# Patient Record
Sex: MALE | Race: BLACK OR AFRICAN AMERICAN | URBAN - METROPOLITAN AREA
[De-identification: names, ages, dates, MRNs, and addresses within clinical notes are randomized per-mention and may not be internally consistent; named-entity substitution may affect disease eponyms.]

---

## 2019-08-13 ENCOUNTER — INPATIENT (INPATIENT)
Facility: HOSPITAL | Age: 34
LOS: 28 days | Discharge: NURSING, REHAB OR LONG-TERM CARE | End: 2019-09-11
Attending: HOSPITALIST | Admitting: HOSPITALIST
Payer: COMMERCIAL

## 2019-08-13 VITALS
SYSTOLIC BLOOD PRESSURE: 124 MMHG | TEMPERATURE: 97 F | RESPIRATION RATE: 18 BRPM | OXYGEN SATURATION: 95 % | WEIGHT: 175.05 LBS | HEART RATE: 95 BPM | DIASTOLIC BLOOD PRESSURE: 78 MMHG | HEIGHT: 74 IN

## 2019-08-13 DIAGNOSIS — Z94.5 SKIN TRANSPLANT STATUS: Chronic | ICD-10-CM

## 2019-08-13 LAB
ALBUMIN SERPL ELPH-MCNC: 4 G/DL — SIGNIFICANT CHANGE UP (ref 3.5–5.2)
ALP SERPL-CCNC: 109 U/L — SIGNIFICANT CHANGE UP (ref 30–115)
ALT FLD-CCNC: 15 U/L — SIGNIFICANT CHANGE UP (ref 0–41)
ANION GAP SERPL CALC-SCNC: 12 MMOL/L — SIGNIFICANT CHANGE UP (ref 7–14)
APTT BLD: SIGNIFICANT CHANGE UP SEC (ref 27–39.2)
AST SERPL-CCNC: 30 U/L — SIGNIFICANT CHANGE UP (ref 0–41)
BASOPHILS # BLD AUTO: 0.03 K/UL — SIGNIFICANT CHANGE UP (ref 0–0.2)
BASOPHILS NFR BLD AUTO: 0.5 % — SIGNIFICANT CHANGE UP (ref 0–1)
BILIRUB SERPL-MCNC: 0.3 MG/DL — SIGNIFICANT CHANGE UP (ref 0.2–1.2)
BUN SERPL-MCNC: 16 MG/DL — SIGNIFICANT CHANGE UP (ref 10–20)
CALCIUM SERPL-MCNC: 9.7 MG/DL — SIGNIFICANT CHANGE UP (ref 8.5–10.1)
CHLORIDE SERPL-SCNC: 102 MMOL/L — SIGNIFICANT CHANGE UP (ref 98–110)
CO2 SERPL-SCNC: 27 MMOL/L — SIGNIFICANT CHANGE UP (ref 17–32)
CREAT SERPL-MCNC: 0.6 MG/DL — LOW (ref 0.7–1.5)
EOSINOPHIL # BLD AUTO: 0.16 K/UL — SIGNIFICANT CHANGE UP (ref 0–0.7)
EOSINOPHIL NFR BLD AUTO: 2.7 % — SIGNIFICANT CHANGE UP (ref 0–8)
GLUCOSE SERPL-MCNC: 98 MG/DL — SIGNIFICANT CHANGE UP (ref 70–99)
HCT VFR BLD CALC: 35.3 % — LOW (ref 42–52)
HGB BLD-MCNC: 11.3 G/DL — LOW (ref 14–18)
IMM GRANULOCYTES NFR BLD AUTO: 0.3 % — SIGNIFICANT CHANGE UP (ref 0.1–0.3)
INR BLD: 1.06 RATIO — SIGNIFICANT CHANGE UP (ref 0.65–1.3)
LYMPHOCYTES # BLD AUTO: 1.39 K/UL — SIGNIFICANT CHANGE UP (ref 1.2–3.4)
LYMPHOCYTES # BLD AUTO: 23.3 % — SIGNIFICANT CHANGE UP (ref 20.5–51.1)
MCHC RBC-ENTMCNC: 28.2 PG — SIGNIFICANT CHANGE UP (ref 27–31)
MCHC RBC-ENTMCNC: 32 G/DL — SIGNIFICANT CHANGE UP (ref 32–37)
MCV RBC AUTO: 88 FL — SIGNIFICANT CHANGE UP (ref 80–94)
MONOCYTES # BLD AUTO: 0.44 K/UL — SIGNIFICANT CHANGE UP (ref 0.1–0.6)
MONOCYTES NFR BLD AUTO: 7.4 % — SIGNIFICANT CHANGE UP (ref 1.7–9.3)
NEUTROPHILS # BLD AUTO: 3.93 K/UL — SIGNIFICANT CHANGE UP (ref 1.4–6.5)
NEUTROPHILS NFR BLD AUTO: 65.8 % — SIGNIFICANT CHANGE UP (ref 42.2–75.2)
NRBC # BLD: 0 /100 WBCS — SIGNIFICANT CHANGE UP (ref 0–0)
PLATELET # BLD AUTO: 419 K/UL — HIGH (ref 130–400)
POTASSIUM SERPL-MCNC: 4.9 MMOL/L — SIGNIFICANT CHANGE UP (ref 3.5–5)
POTASSIUM SERPL-SCNC: 4.9 MMOL/L — SIGNIFICANT CHANGE UP (ref 3.5–5)
PROT SERPL-MCNC: 8.3 G/DL — HIGH (ref 6–8)
PROTHROM AB SERPL-ACNC: 12.2 SEC — SIGNIFICANT CHANGE UP (ref 9.95–12.87)
RBC # BLD: 4.01 M/UL — LOW (ref 4.7–6.1)
RBC # FLD: 16.2 % — HIGH (ref 11.5–14.5)
SODIUM SERPL-SCNC: 141 MMOL/L — SIGNIFICANT CHANGE UP (ref 135–146)
WBC # BLD: 5.97 K/UL — SIGNIFICANT CHANGE UP (ref 4.8–10.8)
WBC # FLD AUTO: 5.97 K/UL — SIGNIFICANT CHANGE UP (ref 4.8–10.8)

## 2019-08-13 PROCEDURE — 99285 EMERGENCY DEPT VISIT HI MDM: CPT

## 2019-08-13 RX ORDER — ENOXAPARIN SODIUM 100 MG/ML
40 INJECTION SUBCUTANEOUS DAILY
Refills: 0 | Status: DISCONTINUED | OUTPATIENT
Start: 2019-08-13 | End: 2019-09-11

## 2019-08-13 RX ORDER — DULOXETINE HYDROCHLORIDE 30 MG/1
30 CAPSULE, DELAYED RELEASE ORAL DAILY
Refills: 0 | Status: DISCONTINUED | OUTPATIENT
Start: 2019-08-13 | End: 2019-09-11

## 2019-08-13 RX ORDER — ZINC SULFATE TAB 220 MG (50 MG ZINC EQUIVALENT) 220 (50 ZN) MG
220 TAB ORAL DAILY
Refills: 0 | Status: DISCONTINUED | OUTPATIENT
Start: 2019-08-13 | End: 2019-09-11

## 2019-08-13 RX ORDER — PANTOPRAZOLE SODIUM 20 MG/1
40 TABLET, DELAYED RELEASE ORAL
Refills: 0 | Status: DISCONTINUED | OUTPATIENT
Start: 2019-08-13 | End: 2019-09-11

## 2019-08-13 RX ORDER — POLYETHYLENE GLYCOL 3350 17 G/17G
17 POWDER, FOR SOLUTION ORAL
Refills: 0 | Status: DISCONTINUED | OUTPATIENT
Start: 2019-08-13 | End: 2019-08-18

## 2019-08-13 RX ORDER — QUETIAPINE FUMARATE 200 MG/1
50 TABLET, FILM COATED ORAL AT BEDTIME
Refills: 0 | Status: DISCONTINUED | OUTPATIENT
Start: 2019-08-13 | End: 2019-09-11

## 2019-08-13 RX ORDER — GABAPENTIN 400 MG/1
300 CAPSULE ORAL THREE TIMES A DAY
Refills: 0 | Status: DISCONTINUED | OUTPATIENT
Start: 2019-08-13 | End: 2019-09-11

## 2019-08-13 RX ORDER — PHENAZOPYRIDINE HCL 100 MG
100 TABLET ORAL EVERY 8 HOURS
Refills: 0 | Status: DISCONTINUED | OUTPATIENT
Start: 2019-08-13 | End: 2019-09-03

## 2019-08-13 RX ORDER — ASCORBIC ACID 60 MG
500 TABLET,CHEWABLE ORAL DAILY
Refills: 0 | Status: DISCONTINUED | OUTPATIENT
Start: 2019-08-13 | End: 2019-09-11

## 2019-08-13 RX ORDER — OXYCODONE AND ACETAMINOPHEN 5; 325 MG/1; MG/1
1 TABLET ORAL ONCE
Refills: 0 | Status: DISCONTINUED | OUTPATIENT
Start: 2019-08-13 | End: 2019-08-13

## 2019-08-13 RX ORDER — TRAZODONE HCL 50 MG
50 TABLET ORAL AT BEDTIME
Refills: 0 | Status: DISCONTINUED | OUTPATIENT
Start: 2019-08-13 | End: 2019-09-11

## 2019-08-13 RX ORDER — MORPHINE SULFATE 50 MG/1
15 CAPSULE, EXTENDED RELEASE ORAL EVERY 4 HOURS
Refills: 0 | Status: DISCONTINUED | OUTPATIENT
Start: 2019-08-13 | End: 2019-08-20

## 2019-08-13 RX ORDER — BACLOFEN 100 %
20 POWDER (GRAM) MISCELLANEOUS THREE TIMES A DAY
Refills: 0 | Status: DISCONTINUED | OUTPATIENT
Start: 2019-08-13 | End: 2019-09-11

## 2019-08-13 RX ORDER — SENNA PLUS 8.6 MG/1
1 TABLET ORAL DAILY
Refills: 0 | Status: DISCONTINUED | OUTPATIENT
Start: 2019-08-13 | End: 2019-09-11

## 2019-08-13 RX ADMIN — GABAPENTIN 300 MILLIGRAM(S): 400 CAPSULE ORAL at 22:52

## 2019-08-13 RX ADMIN — Medication 50 MILLIGRAM(S): at 22:52

## 2019-08-13 RX ADMIN — QUETIAPINE FUMARATE 50 MILLIGRAM(S): 200 TABLET, FILM COATED ORAL at 22:52

## 2019-08-13 RX ADMIN — OXYCODONE AND ACETAMINOPHEN 1 TABLET(S): 5; 325 TABLET ORAL at 18:52

## 2019-08-13 RX ADMIN — Medication 100 MILLIGRAM(S): at 22:53

## 2019-08-13 RX ADMIN — MORPHINE SULFATE 15 MILLIGRAM(S): 50 CAPSULE, EXTENDED RELEASE ORAL at 21:52

## 2019-08-13 RX ADMIN — MORPHINE SULFATE 15 MILLIGRAM(S): 50 CAPSULE, EXTENDED RELEASE ORAL at 22:25

## 2019-08-13 RX ADMIN — Medication 20 MILLIGRAM(S): at 22:52

## 2019-08-13 NOTE — H&P ADULT - NSHPREVIEWOFSYSTEMS_GEN_ALL_CORE
REVIEW OF SYSTEMS:    CONSTITUTIONAL: No weakness, fevers or chills  EYES/ENT: No visual changes;  No vertigo or throat pain   NECK: No pain or stiffness  RESPIRATORY: No cough, wheezing, hemoptysis; No shortness of breath  CARDIOVASCULAR: No chest pain or palpitations  GASTROINTESTINAL: No abdominal or epigastric pain. No nausea, vomiting, or hematemesis; No diarrhea or constipation. No melena or hematochezia.  GENITOURINARY: No dysuria, frequency or hematuria  NEUROLOGICAL: See HPI  SKIN: See HPI
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
## 2019-08-13 NOTE — H&P ADULT - NSHPPHYSICALEXAM_GEN_ALL_CORE
Vital Signs Last 24 Hrs  T(C): 36.8 (13 Aug 2019 19:18), Max: 36.8 (13 Aug 2019 19:18)  T(F): 98.2 (13 Aug 2019 19:18), Max: 98.2 (13 Aug 2019 19:18)  HR: 85 (13 Aug 2019 19:18) (85 - 95)  BP: 129/71 (13 Aug 2019 19:18) (124/78 - 129/71)  BP(mean): --  RR: 18 (13 Aug 2019 19:18) (18 - 18)  SpO2: 96% (13 Aug 2019 19:18) (95% - 96%)    General: WN/WD NAD  Neurology: A&Ox3, Paraplegia with significant wasting of B/L LEs  Eyes: PERRL/EOMI  ENT/Neck: Neck supple, trachea midline, No JVD  Respiratory: CTA B/L, No wheezing, rales, rhonchi  CV: RRR, S1S2, no murmurs, rubs or gallops  Abdominal: Soft, NT, ND +BS, There is a suprapubic catheter in place with a clean insertion point. No drainage or pus  Extremities: No edema, + peripheral pulses  Skin: Pressure ulcer present over the right buttocks 3x7cm in diameter. Clean base. No signs of infection.     Incisions: There are several well-healed incisions noted over the RLL  Tubes: Suprapubic catheter in place

## 2019-08-13 NOTE — ED ADULT NURSE NOTE - CHIEF COMPLAINT QUOTE
Right hip surgery complication last november, was previously in [facility] on antibiotics and now patient states he is not feeling well.

## 2019-08-13 NOTE — H&P ADULT - NSHPLABSRESULTS_GEN_ALL_CORE
11.3   5.97  )-----------( 419      ( 13 Aug 2019 15:52 )             35.3     08-13    141  |  102  |  16  ----------------------------<  98  4.9   |  27  |  0.6<L>    Ca    9.7      13 Aug 2019 15:52  TPro  8.3<H>  /  Alb  4.0  /  TBili  0.3  /  DBili  x   /  AST  30  /  ALT  15  /  AlkPhos  109  08-13
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
## 2019-08-13 NOTE — ED PROVIDER NOTE - CLINICAL SUMMARY MEDICAL DECISION MAKING FREE TEXT BOX
Patient stable improved I will admit patient for rehab evaluation and placement considering his paraplegia

## 2019-08-13 NOTE — PATIENT PROFILE ADULT - PSYCHOSOCIAL CONCERNS - OTHER
pt paraplegic and has a suprapubic catheter transfer training/gait training/balance training/bed mobility training/strengthening

## 2019-08-13 NOTE — ED ADULT NURSE REASSESSMENT NOTE - NS ED NURSE REASSESS COMMENT FT1
Wound cleaned with nss and allevyn dressing applied to area. Patient able to self turn and position in bed with no complications.

## 2019-08-13 NOTE — ED ADULT TRIAGE NOTE - CHIEF COMPLAINT QUOTE
Right hip surgery complication last november, was previously in [facility] on antibiotics and now patient states he is not feeling well.
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
## 2019-08-14 PROCEDURE — 99233 SBSQ HOSP IP/OBS HIGH 50: CPT

## 2019-08-14 RX ADMIN — Medication 1 TABLET(S): at 06:49

## 2019-08-14 RX ADMIN — Medication 20 MILLIGRAM(S): at 15:30

## 2019-08-14 RX ADMIN — ZINC SULFATE TAB 220 MG (50 MG ZINC EQUIVALENT) 220 MILLIGRAM(S): 220 (50 ZN) TAB at 11:06

## 2019-08-14 RX ADMIN — SENNA PLUS 1 TABLET(S): 8.6 TABLET ORAL at 11:06

## 2019-08-14 RX ADMIN — DULOXETINE HYDROCHLORIDE 30 MILLIGRAM(S): 30 CAPSULE, DELAYED RELEASE ORAL at 11:06

## 2019-08-14 RX ADMIN — ENOXAPARIN SODIUM 40 MILLIGRAM(S): 100 INJECTION SUBCUTANEOUS at 11:06

## 2019-08-14 RX ADMIN — Medication 100 MILLIGRAM(S): at 15:30

## 2019-08-14 RX ADMIN — POLYETHYLENE GLYCOL 3350 17 GRAM(S): 17 POWDER, FOR SOLUTION ORAL at 17:56

## 2019-08-14 RX ADMIN — MORPHINE SULFATE 15 MILLIGRAM(S): 50 CAPSULE, EXTENDED RELEASE ORAL at 07:38

## 2019-08-14 RX ADMIN — Medication 500 MILLIGRAM(S): at 11:05

## 2019-08-14 RX ADMIN — Medication 20 MILLIGRAM(S): at 06:49

## 2019-08-14 RX ADMIN — Medication 20 MILLIGRAM(S): at 21:32

## 2019-08-14 RX ADMIN — GABAPENTIN 300 MILLIGRAM(S): 400 CAPSULE ORAL at 21:32

## 2019-08-14 RX ADMIN — Medication 100 MILLIGRAM(S): at 21:33

## 2019-08-14 RX ADMIN — POLYETHYLENE GLYCOL 3350 17 GRAM(S): 17 POWDER, FOR SOLUTION ORAL at 11:06

## 2019-08-14 RX ADMIN — MORPHINE SULFATE 15 MILLIGRAM(S): 50 CAPSULE, EXTENDED RELEASE ORAL at 21:38

## 2019-08-14 RX ADMIN — Medication 1 TABLET(S): at 17:56

## 2019-08-14 RX ADMIN — PANTOPRAZOLE SODIUM 40 MILLIGRAM(S): 20 TABLET, DELAYED RELEASE ORAL at 06:49

## 2019-08-14 RX ADMIN — MORPHINE SULFATE 15 MILLIGRAM(S): 50 CAPSULE, EXTENDED RELEASE ORAL at 15:44

## 2019-08-14 RX ADMIN — GABAPENTIN 300 MILLIGRAM(S): 400 CAPSULE ORAL at 06:49

## 2019-08-14 RX ADMIN — GABAPENTIN 300 MILLIGRAM(S): 400 CAPSULE ORAL at 15:30

## 2019-08-14 RX ADMIN — MORPHINE SULFATE 15 MILLIGRAM(S): 50 CAPSULE, EXTENDED RELEASE ORAL at 15:40

## 2019-08-14 RX ADMIN — Medication 50 MILLIGRAM(S): at 21:32

## 2019-08-14 RX ADMIN — Medication 100 MILLIGRAM(S): at 06:50

## 2019-08-14 RX ADMIN — QUETIAPINE FUMARATE 50 MILLIGRAM(S): 200 TABLET, FILM COATED ORAL at 21:32

## 2019-08-14 RX ADMIN — MORPHINE SULFATE 15 MILLIGRAM(S): 50 CAPSULE, EXTENDED RELEASE ORAL at 07:36

## 2019-08-14 NOTE — ADVANCED PRACTICE NURSE CONSULT - ASSESSMENT
R buttocks full thickness wound wound about 6x4x1 in size. Wound base pale pink, no drainge or foul smell noted    Per patient wound was acquired surgically but did not heal due to being wheel chair bound.
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
## 2019-08-14 NOTE — ADVANCED PRACTICE NURSE CONSULT - RECOMMEDATIONS
Clean wound with normal saline then apply triad hydrophilic dressing twice daily   Pressure ulcer preventive measures  Asses wound and inform provider of any changes

## 2019-08-15 PROCEDURE — 99233 SBSQ HOSP IP/OBS HIGH 50: CPT

## 2019-08-15 RX ADMIN — MORPHINE SULFATE 15 MILLIGRAM(S): 50 CAPSULE, EXTENDED RELEASE ORAL at 12:31

## 2019-08-15 RX ADMIN — Medication 1 TABLET(S): at 05:01

## 2019-08-15 RX ADMIN — Medication 100 MILLIGRAM(S): at 05:09

## 2019-08-15 RX ADMIN — Medication 1 TABLET(S): at 17:45

## 2019-08-15 RX ADMIN — QUETIAPINE FUMARATE 50 MILLIGRAM(S): 200 TABLET, FILM COATED ORAL at 21:38

## 2019-08-15 RX ADMIN — Medication 500 MILLIGRAM(S): at 11:27

## 2019-08-15 RX ADMIN — GABAPENTIN 300 MILLIGRAM(S): 400 CAPSULE ORAL at 05:01

## 2019-08-15 RX ADMIN — Medication 100 MILLIGRAM(S): at 21:40

## 2019-08-15 RX ADMIN — GABAPENTIN 300 MILLIGRAM(S): 400 CAPSULE ORAL at 21:39

## 2019-08-15 RX ADMIN — MORPHINE SULFATE 15 MILLIGRAM(S): 50 CAPSULE, EXTENDED RELEASE ORAL at 21:47

## 2019-08-15 RX ADMIN — MORPHINE SULFATE 15 MILLIGRAM(S): 50 CAPSULE, EXTENDED RELEASE ORAL at 06:56

## 2019-08-15 RX ADMIN — Medication 50 MILLIGRAM(S): at 21:38

## 2019-08-15 RX ADMIN — GABAPENTIN 300 MILLIGRAM(S): 400 CAPSULE ORAL at 13:11

## 2019-08-15 RX ADMIN — Medication 20 MILLIGRAM(S): at 05:01

## 2019-08-15 RX ADMIN — ZINC SULFATE TAB 220 MG (50 MG ZINC EQUIVALENT) 220 MILLIGRAM(S): 220 (50 ZN) TAB at 11:27

## 2019-08-15 RX ADMIN — MORPHINE SULFATE 15 MILLIGRAM(S): 50 CAPSULE, EXTENDED RELEASE ORAL at 16:34

## 2019-08-15 RX ADMIN — PANTOPRAZOLE SODIUM 40 MILLIGRAM(S): 20 TABLET, DELAYED RELEASE ORAL at 05:01

## 2019-08-15 RX ADMIN — Medication 100 MILLIGRAM(S): at 13:12

## 2019-08-15 RX ADMIN — MORPHINE SULFATE 15 MILLIGRAM(S): 50 CAPSULE, EXTENDED RELEASE ORAL at 15:37

## 2019-08-15 RX ADMIN — MORPHINE SULFATE 15 MILLIGRAM(S): 50 CAPSULE, EXTENDED RELEASE ORAL at 05:01

## 2019-08-15 RX ADMIN — POLYETHYLENE GLYCOL 3350 17 GRAM(S): 17 POWDER, FOR SOLUTION ORAL at 17:45

## 2019-08-15 RX ADMIN — POLYETHYLENE GLYCOL 3350 17 GRAM(S): 17 POWDER, FOR SOLUTION ORAL at 05:02

## 2019-08-15 RX ADMIN — MORPHINE SULFATE 15 MILLIGRAM(S): 50 CAPSULE, EXTENDED RELEASE ORAL at 19:59

## 2019-08-15 RX ADMIN — Medication 20 MILLIGRAM(S): at 13:11

## 2019-08-15 RX ADMIN — DULOXETINE HYDROCHLORIDE 30 MILLIGRAM(S): 30 CAPSULE, DELAYED RELEASE ORAL at 11:27

## 2019-08-15 RX ADMIN — Medication 20 MILLIGRAM(S): at 21:39

## 2019-08-15 RX ADMIN — SENNA PLUS 1 TABLET(S): 8.6 TABLET ORAL at 11:27

## 2019-08-15 RX ADMIN — MORPHINE SULFATE 15 MILLIGRAM(S): 50 CAPSULE, EXTENDED RELEASE ORAL at 11:27

## 2019-08-15 NOTE — PHYSICAL THERAPY INITIAL EVALUATION ADULT - IMPAIRED TRANSFERS: BED/CHAIR, REHAB EVAL
decreased strength/decreased sensation/decreased endurance/impaired balance/impaired postural control

## 2019-08-15 NOTE — PHYSICAL THERAPY INITIAL EVALUATION ADULT - BALANCE DISTURBANCE, IDENTIFIED IMPAIRMENT CONTRIBUTE, REHAB EVAL
decreased sensation/impaired sensory feedback/decreased strength/impaired postural control/decreased endurance

## 2019-08-15 NOTE — PHYSICAL THERAPY INITIAL EVALUATION ADULT - ACTIVE RANGE OF MOTION EXAMINATION, REHAB EVAL
deficits as listed below/michael. upper extremity Active ROM was WNL (within normal limits)/no active BLE AROM secondary to paraplegia

## 2019-08-15 NOTE — PHYSICAL THERAPY INITIAL EVALUATION ADULT - GENERAL OBSERVATIONS, REHAB EVAL
10:25 - 10:45. Chart reviewed. Patient available to be seen for physical therapy, confirmed with nurse. Patient encountered semi-reclined in bed, +emmanuel.  Patient denies pain at rest, agreeable for PT evaluation.
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
## 2019-08-16 ENCOUNTER — TRANSCRIPTION ENCOUNTER (OUTPATIENT)
Age: 34
End: 2019-08-16

## 2019-08-16 PROCEDURE — 99233 SBSQ HOSP IP/OBS HIGH 50: CPT

## 2019-08-16 RX ORDER — POLYETHYLENE GLYCOL 3350 17 G/17G
0 POWDER, FOR SOLUTION ORAL
Qty: 0 | Refills: 0 | DISCHARGE

## 2019-08-16 RX ORDER — ACETAMINOPHEN 500 MG
2 TABLET ORAL
Qty: 240 | Refills: 0
Start: 2019-08-16 | End: 2019-09-14

## 2019-08-16 RX ORDER — POLYETHYLENE GLYCOL 3350 17 G/17G
17 POWDER, FOR SOLUTION ORAL
Qty: 200 | Refills: 0
Start: 2019-08-16

## 2019-08-16 RX ADMIN — ZINC SULFATE TAB 220 MG (50 MG ZINC EQUIVALENT) 220 MILLIGRAM(S): 220 (50 ZN) TAB at 13:16

## 2019-08-16 RX ADMIN — Medication 100 MILLIGRAM(S): at 05:15

## 2019-08-16 RX ADMIN — Medication 100 MILLIGRAM(S): at 21:18

## 2019-08-16 RX ADMIN — Medication 20 MILLIGRAM(S): at 05:14

## 2019-08-16 RX ADMIN — MORPHINE SULFATE 15 MILLIGRAM(S): 50 CAPSULE, EXTENDED RELEASE ORAL at 11:52

## 2019-08-16 RX ADMIN — QUETIAPINE FUMARATE 50 MILLIGRAM(S): 200 TABLET, FILM COATED ORAL at 23:50

## 2019-08-16 RX ADMIN — Medication 1 TABLET(S): at 05:14

## 2019-08-16 RX ADMIN — Medication 20 MILLIGRAM(S): at 13:18

## 2019-08-16 RX ADMIN — POLYETHYLENE GLYCOL 3350 17 GRAM(S): 17 POWDER, FOR SOLUTION ORAL at 05:14

## 2019-08-16 RX ADMIN — MORPHINE SULFATE 15 MILLIGRAM(S): 50 CAPSULE, EXTENDED RELEASE ORAL at 18:06

## 2019-08-16 RX ADMIN — Medication 1 TABLET(S): at 16:44

## 2019-08-16 RX ADMIN — Medication 50 MILLIGRAM(S): at 23:50

## 2019-08-16 RX ADMIN — MORPHINE SULFATE 15 MILLIGRAM(S): 50 CAPSULE, EXTENDED RELEASE ORAL at 21:45

## 2019-08-16 RX ADMIN — MORPHINE SULFATE 15 MILLIGRAM(S): 50 CAPSULE, EXTENDED RELEASE ORAL at 07:00

## 2019-08-16 RX ADMIN — MORPHINE SULFATE 15 MILLIGRAM(S): 50 CAPSULE, EXTENDED RELEASE ORAL at 06:14

## 2019-08-16 RX ADMIN — MORPHINE SULFATE 15 MILLIGRAM(S): 50 CAPSULE, EXTENDED RELEASE ORAL at 16:43

## 2019-08-16 RX ADMIN — Medication 500 MILLIGRAM(S): at 13:17

## 2019-08-16 RX ADMIN — Medication 20 MILLIGRAM(S): at 21:18

## 2019-08-16 RX ADMIN — Medication 100 MILLIGRAM(S): at 13:19

## 2019-08-16 RX ADMIN — GABAPENTIN 300 MILLIGRAM(S): 400 CAPSULE ORAL at 05:14

## 2019-08-16 RX ADMIN — GABAPENTIN 300 MILLIGRAM(S): 400 CAPSULE ORAL at 13:18

## 2019-08-16 RX ADMIN — PANTOPRAZOLE SODIUM 40 MILLIGRAM(S): 20 TABLET, DELAYED RELEASE ORAL at 05:14

## 2019-08-16 RX ADMIN — MORPHINE SULFATE 15 MILLIGRAM(S): 50 CAPSULE, EXTENDED RELEASE ORAL at 12:30

## 2019-08-16 RX ADMIN — MORPHINE SULFATE 15 MILLIGRAM(S): 50 CAPSULE, EXTENDED RELEASE ORAL at 21:17

## 2019-08-16 RX ADMIN — DULOXETINE HYDROCHLORIDE 30 MILLIGRAM(S): 30 CAPSULE, DELAYED RELEASE ORAL at 13:17

## 2019-08-16 RX ADMIN — GABAPENTIN 300 MILLIGRAM(S): 400 CAPSULE ORAL at 21:18

## 2019-08-16 RX ADMIN — POLYETHYLENE GLYCOL 3350 17 GRAM(S): 17 POWDER, FOR SOLUTION ORAL at 16:43

## 2019-08-16 RX ADMIN — SENNA PLUS 1 TABLET(S): 8.6 TABLET ORAL at 13:16
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
## 2019-08-16 NOTE — DISCHARGE NOTE PROVIDER - CARE PROVIDER_API CALL
Karo Cat)  Internal Medicine  [address], [address]  [place] [address]  Phone: [phone]  Fax: [phone]  Follow Up Time:

## 2019-08-16 NOTE — DISCHARGE NOTE PROVIDER - NSDCFUADDINST_GEN_ALL_CORE_FT
wound care Right buttock:Clean with normal saline and  Apply triad hydrophilic dressing to buttock wound with guaze and foam tape twice daily.
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
## 2019-08-17 PROCEDURE — 99233 SBSQ HOSP IP/OBS HIGH 50: CPT

## 2019-08-17 RX ADMIN — MORPHINE SULFATE 15 MILLIGRAM(S): 50 CAPSULE, EXTENDED RELEASE ORAL at 17:01

## 2019-08-17 RX ADMIN — SENNA PLUS 1 TABLET(S): 8.6 TABLET ORAL at 11:00

## 2019-08-17 RX ADMIN — DULOXETINE HYDROCHLORIDE 30 MILLIGRAM(S): 30 CAPSULE, DELAYED RELEASE ORAL at 11:00

## 2019-08-17 RX ADMIN — Medication 100 MILLIGRAM(S): at 13:58

## 2019-08-17 RX ADMIN — MORPHINE SULFATE 15 MILLIGRAM(S): 50 CAPSULE, EXTENDED RELEASE ORAL at 21:38

## 2019-08-17 RX ADMIN — GABAPENTIN 300 MILLIGRAM(S): 400 CAPSULE ORAL at 21:34

## 2019-08-17 RX ADMIN — MORPHINE SULFATE 15 MILLIGRAM(S): 50 CAPSULE, EXTENDED RELEASE ORAL at 14:00

## 2019-08-17 RX ADMIN — Medication 1 TABLET(S): at 05:22

## 2019-08-17 RX ADMIN — MORPHINE SULFATE 15 MILLIGRAM(S): 50 CAPSULE, EXTENDED RELEASE ORAL at 22:09

## 2019-08-17 RX ADMIN — MORPHINE SULFATE 15 MILLIGRAM(S): 50 CAPSULE, EXTENDED RELEASE ORAL at 16:48

## 2019-08-17 RX ADMIN — Medication 100 MILLIGRAM(S): at 21:34

## 2019-08-17 RX ADMIN — GABAPENTIN 300 MILLIGRAM(S): 400 CAPSULE ORAL at 05:23

## 2019-08-17 RX ADMIN — Medication 20 MILLIGRAM(S): at 13:58

## 2019-08-17 RX ADMIN — Medication 100 MILLIGRAM(S): at 05:23

## 2019-08-17 RX ADMIN — Medication 1 TABLET(S): at 17:03

## 2019-08-17 RX ADMIN — Medication 20 MILLIGRAM(S): at 05:22

## 2019-08-17 RX ADMIN — Medication 500 MILLIGRAM(S): at 11:00

## 2019-08-17 RX ADMIN — MORPHINE SULFATE 15 MILLIGRAM(S): 50 CAPSULE, EXTENDED RELEASE ORAL at 12:27

## 2019-08-17 RX ADMIN — POLYETHYLENE GLYCOL 3350 17 GRAM(S): 17 POWDER, FOR SOLUTION ORAL at 17:03

## 2019-08-17 RX ADMIN — ZINC SULFATE TAB 220 MG (50 MG ZINC EQUIVALENT) 220 MILLIGRAM(S): 220 (50 ZN) TAB at 11:00

## 2019-08-17 RX ADMIN — PANTOPRAZOLE SODIUM 40 MILLIGRAM(S): 20 TABLET, DELAYED RELEASE ORAL at 05:22

## 2019-08-17 RX ADMIN — MORPHINE SULFATE 15 MILLIGRAM(S): 50 CAPSULE, EXTENDED RELEASE ORAL at 08:40

## 2019-08-17 RX ADMIN — MORPHINE SULFATE 15 MILLIGRAM(S): 50 CAPSULE, EXTENDED RELEASE ORAL at 07:43

## 2019-08-17 RX ADMIN — Medication 20 MILLIGRAM(S): at 21:34

## 2019-08-17 RX ADMIN — GABAPENTIN 300 MILLIGRAM(S): 400 CAPSULE ORAL at 13:58
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
## 2019-08-18 LAB
ANION GAP SERPL CALC-SCNC: 13 MMOL/L — SIGNIFICANT CHANGE UP (ref 7–14)
BUN SERPL-MCNC: 13 MG/DL — SIGNIFICANT CHANGE UP (ref 10–20)
CALCIUM SERPL-MCNC: 9.6 MG/DL — SIGNIFICANT CHANGE UP (ref 8.5–10.1)
CHLORIDE SERPL-SCNC: 98 MMOL/L — SIGNIFICANT CHANGE UP (ref 98–110)
CO2 SERPL-SCNC: 27 MMOL/L — SIGNIFICANT CHANGE UP (ref 17–32)
CREAT SERPL-MCNC: 0.6 MG/DL — LOW (ref 0.7–1.5)
GLUCOSE SERPL-MCNC: 82 MG/DL — SIGNIFICANT CHANGE UP (ref 70–99)
HCT VFR BLD CALC: 36.3 % — LOW (ref 42–52)
HGB BLD-MCNC: 11.6 G/DL — LOW (ref 14–18)
MCHC RBC-ENTMCNC: 27.7 PG — SIGNIFICANT CHANGE UP (ref 27–31)
MCHC RBC-ENTMCNC: 32 G/DL — SIGNIFICANT CHANGE UP (ref 32–37)
MCV RBC AUTO: 86.6 FL — SIGNIFICANT CHANGE UP (ref 80–94)
NRBC # BLD: 0 /100 WBCS — SIGNIFICANT CHANGE UP (ref 0–0)
PLATELET # BLD AUTO: 457 K/UL — HIGH (ref 130–400)
POTASSIUM SERPL-MCNC: 4.9 MMOL/L — SIGNIFICANT CHANGE UP (ref 3.5–5)
POTASSIUM SERPL-SCNC: 4.9 MMOL/L — SIGNIFICANT CHANGE UP (ref 3.5–5)
RBC # BLD: 4.19 M/UL — LOW (ref 4.7–6.1)
RBC # FLD: 15.3 % — HIGH (ref 11.5–14.5)
SODIUM SERPL-SCNC: 138 MMOL/L — SIGNIFICANT CHANGE UP (ref 135–146)
WBC # BLD: 4.46 K/UL — LOW (ref 4.8–10.8)
WBC # FLD AUTO: 4.46 K/UL — LOW (ref 4.8–10.8)

## 2019-08-18 PROCEDURE — 99233 SBSQ HOSP IP/OBS HIGH 50: CPT

## 2019-08-18 RX ORDER — POLYETHYLENE GLYCOL 3350 17 G/17G
17 POWDER, FOR SOLUTION ORAL
Refills: 0 | Status: DISCONTINUED | OUTPATIENT
Start: 2019-08-18 | End: 2019-09-11

## 2019-08-18 RX ADMIN — MORPHINE SULFATE 15 MILLIGRAM(S): 50 CAPSULE, EXTENDED RELEASE ORAL at 19:40

## 2019-08-18 RX ADMIN — Medication 20 MILLIGRAM(S): at 05:42

## 2019-08-18 RX ADMIN — GABAPENTIN 300 MILLIGRAM(S): 400 CAPSULE ORAL at 13:01

## 2019-08-18 RX ADMIN — SENNA PLUS 1 TABLET(S): 8.6 TABLET ORAL at 11:00

## 2019-08-18 RX ADMIN — MORPHINE SULFATE 15 MILLIGRAM(S): 50 CAPSULE, EXTENDED RELEASE ORAL at 15:33

## 2019-08-18 RX ADMIN — Medication 50 MILLIGRAM(S): at 01:07

## 2019-08-18 RX ADMIN — MORPHINE SULFATE 15 MILLIGRAM(S): 50 CAPSULE, EXTENDED RELEASE ORAL at 10:25

## 2019-08-18 RX ADMIN — Medication 100 MILLIGRAM(S): at 13:01

## 2019-08-18 RX ADMIN — PANTOPRAZOLE SODIUM 40 MILLIGRAM(S): 20 TABLET, DELAYED RELEASE ORAL at 05:42

## 2019-08-18 RX ADMIN — MORPHINE SULFATE 15 MILLIGRAM(S): 50 CAPSULE, EXTENDED RELEASE ORAL at 23:50

## 2019-08-18 RX ADMIN — Medication 20 MILLIGRAM(S): at 21:29

## 2019-08-18 RX ADMIN — Medication 1 TABLET(S): at 17:21

## 2019-08-18 RX ADMIN — DULOXETINE HYDROCHLORIDE 30 MILLIGRAM(S): 30 CAPSULE, DELAYED RELEASE ORAL at 11:00

## 2019-08-18 RX ADMIN — MORPHINE SULFATE 15 MILLIGRAM(S): 50 CAPSULE, EXTENDED RELEASE ORAL at 15:54

## 2019-08-18 RX ADMIN — Medication 1 TABLET(S): at 05:42

## 2019-08-18 RX ADMIN — POLYETHYLENE GLYCOL 3350 17 GRAM(S): 17 POWDER, FOR SOLUTION ORAL at 17:21

## 2019-08-18 RX ADMIN — Medication 100 MILLIGRAM(S): at 21:30

## 2019-08-18 RX ADMIN — Medication 100 MILLIGRAM(S): at 05:43

## 2019-08-18 RX ADMIN — ZINC SULFATE TAB 220 MG (50 MG ZINC EQUIVALENT) 220 MILLIGRAM(S): 220 (50 ZN) TAB at 11:00

## 2019-08-18 RX ADMIN — MORPHINE SULFATE 15 MILLIGRAM(S): 50 CAPSULE, EXTENDED RELEASE ORAL at 09:53

## 2019-08-18 RX ADMIN — GABAPENTIN 300 MILLIGRAM(S): 400 CAPSULE ORAL at 21:29

## 2019-08-18 RX ADMIN — Medication 500 MILLIGRAM(S): at 11:00

## 2019-08-18 RX ADMIN — MORPHINE SULFATE 15 MILLIGRAM(S): 50 CAPSULE, EXTENDED RELEASE ORAL at 03:21

## 2019-08-18 RX ADMIN — MORPHINE SULFATE 15 MILLIGRAM(S): 50 CAPSULE, EXTENDED RELEASE ORAL at 02:51

## 2019-08-18 RX ADMIN — MORPHINE SULFATE 15 MILLIGRAM(S): 50 CAPSULE, EXTENDED RELEASE ORAL at 20:12

## 2019-08-18 RX ADMIN — GABAPENTIN 300 MILLIGRAM(S): 400 CAPSULE ORAL at 05:42

## 2019-08-18 RX ADMIN — Medication 20 MILLIGRAM(S): at 13:01
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
## 2019-08-19 PROCEDURE — 99233 SBSQ HOSP IP/OBS HIGH 50: CPT

## 2019-08-19 RX ORDER — MORPHINE SULFATE 50 MG/1
1 CAPSULE, EXTENDED RELEASE ORAL
Qty: 180 | Refills: 0
Start: 2019-08-19 | End: 2019-09-17

## 2019-08-19 RX ADMIN — Medication 50 MILLIGRAM(S): at 21:39

## 2019-08-19 RX ADMIN — Medication 20 MILLIGRAM(S): at 13:04

## 2019-08-19 RX ADMIN — Medication 500 MILLIGRAM(S): at 11:13

## 2019-08-19 RX ADMIN — Medication 1 TABLET(S): at 17:24

## 2019-08-19 RX ADMIN — GABAPENTIN 300 MILLIGRAM(S): 400 CAPSULE ORAL at 21:40

## 2019-08-19 RX ADMIN — DULOXETINE HYDROCHLORIDE 30 MILLIGRAM(S): 30 CAPSULE, DELAYED RELEASE ORAL at 11:14

## 2019-08-19 RX ADMIN — PANTOPRAZOLE SODIUM 40 MILLIGRAM(S): 20 TABLET, DELAYED RELEASE ORAL at 06:26

## 2019-08-19 RX ADMIN — MORPHINE SULFATE 15 MILLIGRAM(S): 50 CAPSULE, EXTENDED RELEASE ORAL at 04:23

## 2019-08-19 RX ADMIN — GABAPENTIN 300 MILLIGRAM(S): 400 CAPSULE ORAL at 05:41

## 2019-08-19 RX ADMIN — POLYETHYLENE GLYCOL 3350 17 GRAM(S): 17 POWDER, FOR SOLUTION ORAL at 17:23

## 2019-08-19 RX ADMIN — ZINC SULFATE TAB 220 MG (50 MG ZINC EQUIVALENT) 220 MILLIGRAM(S): 220 (50 ZN) TAB at 11:13

## 2019-08-19 RX ADMIN — Medication 100 MILLIGRAM(S): at 21:40

## 2019-08-19 RX ADMIN — MORPHINE SULFATE 15 MILLIGRAM(S): 50 CAPSULE, EXTENDED RELEASE ORAL at 04:53

## 2019-08-19 RX ADMIN — Medication 1 TABLET(S): at 05:41

## 2019-08-19 RX ADMIN — MORPHINE SULFATE 15 MILLIGRAM(S): 50 CAPSULE, EXTENDED RELEASE ORAL at 13:03

## 2019-08-19 RX ADMIN — MORPHINE SULFATE 15 MILLIGRAM(S): 50 CAPSULE, EXTENDED RELEASE ORAL at 17:24

## 2019-08-19 RX ADMIN — Medication 100 MILLIGRAM(S): at 13:04

## 2019-08-19 RX ADMIN — MORPHINE SULFATE 15 MILLIGRAM(S): 50 CAPSULE, EXTENDED RELEASE ORAL at 09:01

## 2019-08-19 RX ADMIN — MORPHINE SULFATE 15 MILLIGRAM(S): 50 CAPSULE, EXTENDED RELEASE ORAL at 00:20

## 2019-08-19 RX ADMIN — SENNA PLUS 1 TABLET(S): 8.6 TABLET ORAL at 11:13

## 2019-08-19 RX ADMIN — MORPHINE SULFATE 15 MILLIGRAM(S): 50 CAPSULE, EXTENDED RELEASE ORAL at 13:17

## 2019-08-19 RX ADMIN — MORPHINE SULFATE 15 MILLIGRAM(S): 50 CAPSULE, EXTENDED RELEASE ORAL at 09:30

## 2019-08-19 RX ADMIN — Medication 50 MILLIGRAM(S): at 01:23

## 2019-08-19 RX ADMIN — Medication 20 MILLIGRAM(S): at 05:41

## 2019-08-19 RX ADMIN — Medication 100 MILLIGRAM(S): at 05:41

## 2019-08-19 RX ADMIN — Medication 20 MILLIGRAM(S): at 21:40

## 2019-08-19 RX ADMIN — GABAPENTIN 300 MILLIGRAM(S): 400 CAPSULE ORAL at 13:03

## 2019-08-19 RX ADMIN — QUETIAPINE FUMARATE 50 MILLIGRAM(S): 200 TABLET, FILM COATED ORAL at 21:39

## 2019-08-19 RX ADMIN — POLYETHYLENE GLYCOL 3350 17 GRAM(S): 17 POWDER, FOR SOLUTION ORAL at 10:12
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
## 2019-08-20 PROCEDURE — 99232 SBSQ HOSP IP/OBS MODERATE 35: CPT

## 2019-08-20 RX ORDER — CHLORHEXIDINE GLUCONATE 213 G/1000ML
1 SOLUTION TOPICAL
Refills: 0 | Status: DISCONTINUED | OUTPATIENT
Start: 2019-08-20 | End: 2019-09-11

## 2019-08-20 RX ADMIN — SENNA PLUS 1 TABLET(S): 8.6 TABLET ORAL at 12:02

## 2019-08-20 RX ADMIN — MORPHINE SULFATE 15 MILLIGRAM(S): 50 CAPSULE, EXTENDED RELEASE ORAL at 21:30

## 2019-08-20 RX ADMIN — POLYETHYLENE GLYCOL 3350 17 GRAM(S): 17 POWDER, FOR SOLUTION ORAL at 17:48

## 2019-08-20 RX ADMIN — POLYETHYLENE GLYCOL 3350 17 GRAM(S): 17 POWDER, FOR SOLUTION ORAL at 12:02

## 2019-08-20 RX ADMIN — Medication 1 TABLET(S): at 05:30

## 2019-08-20 RX ADMIN — GABAPENTIN 300 MILLIGRAM(S): 400 CAPSULE ORAL at 21:36

## 2019-08-20 RX ADMIN — PANTOPRAZOLE SODIUM 40 MILLIGRAM(S): 20 TABLET, DELAYED RELEASE ORAL at 05:29

## 2019-08-20 RX ADMIN — Medication 50 MILLIGRAM(S): at 21:35

## 2019-08-20 RX ADMIN — DULOXETINE HYDROCHLORIDE 30 MILLIGRAM(S): 30 CAPSULE, DELAYED RELEASE ORAL at 12:02

## 2019-08-20 RX ADMIN — MORPHINE SULFATE 15 MILLIGRAM(S): 50 CAPSULE, EXTENDED RELEASE ORAL at 12:01

## 2019-08-20 RX ADMIN — Medication 20 MILLIGRAM(S): at 21:36

## 2019-08-20 RX ADMIN — Medication 500 MILLIGRAM(S): at 12:02

## 2019-08-20 RX ADMIN — GABAPENTIN 300 MILLIGRAM(S): 400 CAPSULE ORAL at 13:19

## 2019-08-20 RX ADMIN — MORPHINE SULFATE 15 MILLIGRAM(S): 50 CAPSULE, EXTENDED RELEASE ORAL at 17:47

## 2019-08-20 RX ADMIN — Medication 20 MILLIGRAM(S): at 13:20

## 2019-08-20 RX ADMIN — Medication 1 TABLET(S): at 17:47

## 2019-08-20 RX ADMIN — MORPHINE SULFATE 15 MILLIGRAM(S): 50 CAPSULE, EXTENDED RELEASE ORAL at 21:35

## 2019-08-20 RX ADMIN — Medication 100 MILLIGRAM(S): at 21:37

## 2019-08-20 RX ADMIN — Medication 100 MILLIGRAM(S): at 13:21

## 2019-08-20 RX ADMIN — QUETIAPINE FUMARATE 50 MILLIGRAM(S): 200 TABLET, FILM COATED ORAL at 21:35

## 2019-08-20 RX ADMIN — GABAPENTIN 300 MILLIGRAM(S): 400 CAPSULE ORAL at 05:29

## 2019-08-20 RX ADMIN — Medication 20 MILLIGRAM(S): at 05:29

## 2019-08-20 RX ADMIN — Medication 100 MILLIGRAM(S): at 05:30

## 2019-08-20 RX ADMIN — ZINC SULFATE TAB 220 MG (50 MG ZINC EQUIVALENT) 220 MILLIGRAM(S): 220 (50 ZN) TAB at 12:02
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
## 2019-08-21 PROCEDURE — 99232 SBSQ HOSP IP/OBS MODERATE 35: CPT

## 2019-08-21 RX ORDER — MORPHINE SULFATE 50 MG/1
15 CAPSULE, EXTENDED RELEASE ORAL EVERY 4 HOURS
Refills: 0 | Status: DISCONTINUED | OUTPATIENT
Start: 2019-08-21 | End: 2019-08-27

## 2019-08-21 RX ADMIN — MORPHINE SULFATE 15 MILLIGRAM(S): 50 CAPSULE, EXTENDED RELEASE ORAL at 08:27

## 2019-08-21 RX ADMIN — Medication 20 MILLIGRAM(S): at 05:27

## 2019-08-21 RX ADMIN — Medication 100 MILLIGRAM(S): at 05:28

## 2019-08-21 RX ADMIN — Medication 20 MILLIGRAM(S): at 15:09

## 2019-08-21 RX ADMIN — Medication 1 TABLET(S): at 05:27

## 2019-08-21 RX ADMIN — GABAPENTIN 300 MILLIGRAM(S): 400 CAPSULE ORAL at 05:28

## 2019-08-21 RX ADMIN — QUETIAPINE FUMARATE 50 MILLIGRAM(S): 200 TABLET, FILM COATED ORAL at 21:15

## 2019-08-21 RX ADMIN — GABAPENTIN 300 MILLIGRAM(S): 400 CAPSULE ORAL at 15:09

## 2019-08-21 RX ADMIN — ZINC SULFATE TAB 220 MG (50 MG ZINC EQUIVALENT) 220 MILLIGRAM(S): 220 (50 ZN) TAB at 11:42

## 2019-08-21 RX ADMIN — Medication 100 MILLIGRAM(S): at 21:16

## 2019-08-21 RX ADMIN — POLYETHYLENE GLYCOL 3350 17 GRAM(S): 17 POWDER, FOR SOLUTION ORAL at 11:43

## 2019-08-21 RX ADMIN — DULOXETINE HYDROCHLORIDE 30 MILLIGRAM(S): 30 CAPSULE, DELAYED RELEASE ORAL at 11:42

## 2019-08-21 RX ADMIN — Medication 50 MILLIGRAM(S): at 21:15

## 2019-08-21 RX ADMIN — POLYETHYLENE GLYCOL 3350 17 GRAM(S): 17 POWDER, FOR SOLUTION ORAL at 18:11

## 2019-08-21 RX ADMIN — Medication 500 MILLIGRAM(S): at 11:42

## 2019-08-21 RX ADMIN — PANTOPRAZOLE SODIUM 40 MILLIGRAM(S): 20 TABLET, DELAYED RELEASE ORAL at 05:29

## 2019-08-21 RX ADMIN — SENNA PLUS 1 TABLET(S): 8.6 TABLET ORAL at 11:42

## 2019-08-21 RX ADMIN — MORPHINE SULFATE 15 MILLIGRAM(S): 50 CAPSULE, EXTENDED RELEASE ORAL at 03:01

## 2019-08-21 RX ADMIN — GABAPENTIN 300 MILLIGRAM(S): 400 CAPSULE ORAL at 21:15

## 2019-08-21 RX ADMIN — MORPHINE SULFATE 15 MILLIGRAM(S): 50 CAPSULE, EXTENDED RELEASE ORAL at 18:57

## 2019-08-21 RX ADMIN — MORPHINE SULFATE 15 MILLIGRAM(S): 50 CAPSULE, EXTENDED RELEASE ORAL at 14:31

## 2019-08-21 RX ADMIN — Medication 20 MILLIGRAM(S): at 21:15

## 2019-08-21 RX ADMIN — MORPHINE SULFATE 15 MILLIGRAM(S): 50 CAPSULE, EXTENDED RELEASE ORAL at 03:30

## 2019-08-21 RX ADMIN — Medication 100 MILLIGRAM(S): at 15:10

## 2019-08-21 NOTE — PROGRESS NOTE ADULT - SUBJECTIVE AND OBJECTIVE BOX
CAITYMARCELINO CHAUDHRY  34y, Male  Allergy: ibuprofen (Hives; Rash)    Hospital Day: 8d    Patient seen and examined earlier today.     PMH/PSH:  PAST MEDICAL & SURGICAL HISTORY:  Paraplegia  Chronic UTI  History of skin graft    VITALS:  T(F): 97.7 (08-21-19 @ 05:17), Max: 99.7 (08-20-19 @ 22:55)  HR: 83 (08-21-19 @ 05:17)  BP: 127/88 (08-21-19 @ 05:17) (121/82 - 135/100)  RR: 16 (08-21-19 @ 05:17)  SpO2: --    PHYSICAL EXAM:  GENERAL: NAD  HEENT: MMM  CVS:  RRR  CHEST/LUNG: Good air entry, no wheeze  ABD:  soft, NT/ND +bs  EXTREMITIES:  no edema  NEURO: AOx3, b/l LE paraplegia  skin:  Rt buttock surgical wound    TESTS & MEASUREMENTS:    RADIOLOGY & ADDITIONAL TESTS:      MEDICATIONS:  MEDICATIONS  (STANDING):  ascorbic acid 500 milliGRAM(s) Oral daily  baclofen 20 milliGRAM(s) Oral three times a day  chlorhexidine 4% Liquid 1 Application(s) Topical <User Schedule>  DULoxetine 30 milliGRAM(s) Oral daily  enoxaparin Injectable 40 milliGRAM(s) SubCutaneous daily  gabapentin 300 milliGRAM(s) Oral three times a day  pantoprazole    Tablet 40 milliGRAM(s) Oral before breakfast  phenazopyridine 100 milliGRAM(s) Oral every 8 hours  polyethylene glycol 3350 17 Gram(s) Oral <User Schedule>  QUEtiapine 50 milliGRAM(s) Oral at bedtime  senna 1 Tablet(s) Oral daily  traZODone 50 milliGRAM(s) Oral at bedtime  trimethoprim  160 mG/sulfamethoxazole 800 mG 1 Tablet(s) Oral two times a day  zinc sulfate 220 milliGRAM(s) Oral daily    MEDICATIONS  (PRN):  morphine  IR 15 milliGRAM(s) Oral every 4 hours PRN Severe Pain (7 - 10)      HOME MEDICATIONS:  baclofen 20 mg oral tablet (08-13)  DULoxetine 30 mg oral delayed release capsule (08-13)  gabapentin 300 mg oral capsule (08-13)  pantoprazole 40 mg oral delayed release tablet (08-13)  QUEtiapine 50 mg oral tablet (08-13)  Senna 8.6 mg oral tablet (08-13)  traZODone 50 mg oral tablet (08-13)  Vitamin C 500 mg oral tablet (08-13)  zinc sulfate 220 mg oral capsule (08-13)
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
## 2019-08-22 PROCEDURE — 99231 SBSQ HOSP IP/OBS SF/LOW 25: CPT

## 2019-08-22 RX ADMIN — GABAPENTIN 300 MILLIGRAM(S): 400 CAPSULE ORAL at 05:34

## 2019-08-22 RX ADMIN — POLYETHYLENE GLYCOL 3350 17 GRAM(S): 17 POWDER, FOR SOLUTION ORAL at 11:00

## 2019-08-22 RX ADMIN — Medication 20 MILLIGRAM(S): at 11:02

## 2019-08-22 RX ADMIN — PANTOPRAZOLE SODIUM 40 MILLIGRAM(S): 20 TABLET, DELAYED RELEASE ORAL at 05:34

## 2019-08-22 RX ADMIN — SENNA PLUS 1 TABLET(S): 8.6 TABLET ORAL at 11:01

## 2019-08-22 RX ADMIN — Medication 100 MILLIGRAM(S): at 11:03

## 2019-08-22 RX ADMIN — MORPHINE SULFATE 15 MILLIGRAM(S): 50 CAPSULE, EXTENDED RELEASE ORAL at 20:15

## 2019-08-22 RX ADMIN — MORPHINE SULFATE 15 MILLIGRAM(S): 50 CAPSULE, EXTENDED RELEASE ORAL at 15:35

## 2019-08-22 RX ADMIN — POLYETHYLENE GLYCOL 3350 17 GRAM(S): 17 POWDER, FOR SOLUTION ORAL at 18:02

## 2019-08-22 RX ADMIN — GABAPENTIN 300 MILLIGRAM(S): 400 CAPSULE ORAL at 21:52

## 2019-08-22 RX ADMIN — MORPHINE SULFATE 15 MILLIGRAM(S): 50 CAPSULE, EXTENDED RELEASE ORAL at 06:20

## 2019-08-22 RX ADMIN — ZINC SULFATE TAB 220 MG (50 MG ZINC EQUIVALENT) 220 MILLIGRAM(S): 220 (50 ZN) TAB at 11:01

## 2019-08-22 RX ADMIN — MORPHINE SULFATE 15 MILLIGRAM(S): 50 CAPSULE, EXTENDED RELEASE ORAL at 19:45

## 2019-08-22 RX ADMIN — Medication 100 MILLIGRAM(S): at 21:52

## 2019-08-22 RX ADMIN — MORPHINE SULFATE 15 MILLIGRAM(S): 50 CAPSULE, EXTENDED RELEASE ORAL at 05:39

## 2019-08-22 RX ADMIN — Medication 20 MILLIGRAM(S): at 05:34

## 2019-08-22 RX ADMIN — Medication 20 MILLIGRAM(S): at 21:52

## 2019-08-22 RX ADMIN — Medication 100 MILLIGRAM(S): at 05:34

## 2019-08-22 RX ADMIN — MORPHINE SULFATE 15 MILLIGRAM(S): 50 CAPSULE, EXTENDED RELEASE ORAL at 11:11

## 2019-08-22 RX ADMIN — MORPHINE SULFATE 15 MILLIGRAM(S): 50 CAPSULE, EXTENDED RELEASE ORAL at 15:34

## 2019-08-22 RX ADMIN — Medication 50 MILLIGRAM(S): at 21:52

## 2019-08-22 RX ADMIN — Medication 500 MILLIGRAM(S): at 11:00

## 2019-08-22 RX ADMIN — ENOXAPARIN SODIUM 40 MILLIGRAM(S): 100 INJECTION SUBCUTANEOUS at 11:01

## 2019-08-22 RX ADMIN — DULOXETINE HYDROCHLORIDE 30 MILLIGRAM(S): 30 CAPSULE, DELAYED RELEASE ORAL at 11:01

## 2019-08-22 RX ADMIN — GABAPENTIN 300 MILLIGRAM(S): 400 CAPSULE ORAL at 11:02

## 2019-08-22 NOTE — PROGRESS NOTE ADULT - SUBJECTIVE AND OBJECTIVE BOX
CAITY MARCELINO  34y, Male  Allergy: ibuprofen (Hives; Rash)    Hospital Day: 9d    Patient seen and examined earlier today.  No new issues.  waiting on placement.    PMH/PSH:  PAST MEDICAL & SURGICAL HISTORY:  Paraplegia  Chronic UTI  History of skin graft        VITALS:  T(F): 97.8 (08-22-19 @ 05:15), Max: 98 (08-21-19 @ 21:29)  HR: 101 (08-22-19 @ 05:15)  BP: 106/53 (08-22-19 @ 05:15) (106/53 - 164/82)  RR: 18 (08-22-19 @ 05:15)  SpO2: --    PHYSICAL EXAM:  GENERAL: NAD  HEENT: MMM  CVS:  RRR  CHEST/LUNG: Good air entry, no wheeze  ABD:  soft, NT/ND +bs  EXTREMITIES:  no edema  NEURO: b/l paraplegia, Rt buttock dressing in place    TESTS & MEASUREMENTS:    RADIOLOGY & ADDITIONAL TESTS:      MEDICATIONS:  MEDICATIONS  (STANDING):  ascorbic acid 500 milliGRAM(s) Oral daily  baclofen 20 milliGRAM(s) Oral three times a day  chlorhexidine 4% Liquid 1 Application(s) Topical <User Schedule>  DULoxetine 30 milliGRAM(s) Oral daily  enoxaparin Injectable 40 milliGRAM(s) SubCutaneous daily  gabapentin 300 milliGRAM(s) Oral three times a day  pantoprazole    Tablet 40 milliGRAM(s) Oral before breakfast  phenazopyridine 100 milliGRAM(s) Oral every 8 hours  polyethylene glycol 3350 17 Gram(s) Oral <User Schedule>  QUEtiapine 50 milliGRAM(s) Oral at bedtime  senna 1 Tablet(s) Oral daily  traZODone 50 milliGRAM(s) Oral at bedtime  zinc sulfate 220 milliGRAM(s) Oral daily    MEDICATIONS  (PRN):  morphine  IR 15 milliGRAM(s) Oral every 4 hours PRN Severe Pain (7 - 10)      HOME MEDICATIONS:  baclofen 20 mg oral tablet (08-13)  DULoxetine 30 mg oral delayed release capsule (08-13)  gabapentin 300 mg oral capsule (08-13)  pantoprazole 40 mg oral delayed release tablet (08-13)  QUEtiapine 50 mg oral tablet (08-13)  Senna 8.6 mg oral tablet (08-13)  traZODone 50 mg oral tablet (08-13)  Vitamin C 500 mg oral tablet (08-13)  zinc sulfate 220 mg oral capsule (08-13)

## 2019-08-23 PROCEDURE — 99231 SBSQ HOSP IP/OBS SF/LOW 25: CPT

## 2019-08-23 RX ADMIN — MORPHINE SULFATE 15 MILLIGRAM(S): 50 CAPSULE, EXTENDED RELEASE ORAL at 19:49

## 2019-08-23 RX ADMIN — MORPHINE SULFATE 15 MILLIGRAM(S): 50 CAPSULE, EXTENDED RELEASE ORAL at 14:54

## 2019-08-23 RX ADMIN — MORPHINE SULFATE 15 MILLIGRAM(S): 50 CAPSULE, EXTENDED RELEASE ORAL at 12:51

## 2019-08-23 RX ADMIN — QUETIAPINE FUMARATE 50 MILLIGRAM(S): 200 TABLET, FILM COATED ORAL at 21:39

## 2019-08-23 RX ADMIN — MORPHINE SULFATE 15 MILLIGRAM(S): 50 CAPSULE, EXTENDED RELEASE ORAL at 15:29

## 2019-08-23 RX ADMIN — Medication 500 MILLIGRAM(S): at 12:52

## 2019-08-23 RX ADMIN — MORPHINE SULFATE 15 MILLIGRAM(S): 50 CAPSULE, EXTENDED RELEASE ORAL at 23:49

## 2019-08-23 RX ADMIN — Medication 20 MILLIGRAM(S): at 05:20

## 2019-08-23 RX ADMIN — GABAPENTIN 300 MILLIGRAM(S): 400 CAPSULE ORAL at 12:52

## 2019-08-23 RX ADMIN — ZINC SULFATE TAB 220 MG (50 MG ZINC EQUIVALENT) 220 MILLIGRAM(S): 220 (50 ZN) TAB at 12:52

## 2019-08-23 RX ADMIN — MORPHINE SULFATE 15 MILLIGRAM(S): 50 CAPSULE, EXTENDED RELEASE ORAL at 10:21

## 2019-08-23 RX ADMIN — Medication 50 MILLIGRAM(S): at 21:40

## 2019-08-23 RX ADMIN — POLYETHYLENE GLYCOL 3350 17 GRAM(S): 17 POWDER, FOR SOLUTION ORAL at 10:22

## 2019-08-23 RX ADMIN — GABAPENTIN 300 MILLIGRAM(S): 400 CAPSULE ORAL at 21:39

## 2019-08-23 RX ADMIN — GABAPENTIN 300 MILLIGRAM(S): 400 CAPSULE ORAL at 05:20

## 2019-08-23 RX ADMIN — CHLORHEXIDINE GLUCONATE 1 APPLICATION(S): 213 SOLUTION TOPICAL at 05:19

## 2019-08-23 RX ADMIN — MORPHINE SULFATE 15 MILLIGRAM(S): 50 CAPSULE, EXTENDED RELEASE ORAL at 18:50

## 2019-08-23 RX ADMIN — Medication 20 MILLIGRAM(S): at 21:40

## 2019-08-23 RX ADMIN — POLYETHYLENE GLYCOL 3350 17 GRAM(S): 17 POWDER, FOR SOLUTION ORAL at 18:03

## 2019-08-23 RX ADMIN — DULOXETINE HYDROCHLORIDE 30 MILLIGRAM(S): 30 CAPSULE, DELAYED RELEASE ORAL at 12:53

## 2019-08-23 RX ADMIN — PANTOPRAZOLE SODIUM 40 MILLIGRAM(S): 20 TABLET, DELAYED RELEASE ORAL at 05:20

## 2019-08-23 RX ADMIN — Medication 100 MILLIGRAM(S): at 05:20

## 2019-08-23 RX ADMIN — SENNA PLUS 1 TABLET(S): 8.6 TABLET ORAL at 12:52

## 2019-08-23 RX ADMIN — Medication 100 MILLIGRAM(S): at 21:41

## 2019-08-23 RX ADMIN — MORPHINE SULFATE 15 MILLIGRAM(S): 50 CAPSULE, EXTENDED RELEASE ORAL at 01:16

## 2019-08-23 RX ADMIN — Medication 20 MILLIGRAM(S): at 12:52

## 2019-08-23 RX ADMIN — Medication 100 MILLIGRAM(S): at 12:53
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
## 2019-08-23 NOTE — PROGRESS NOTE ADULT - SUBJECTIVE AND OBJECTIVE BOX
MARCELINO CAROLINA  34y, Male  Allergy: ibuprofen (Hives; Rash)    Hospital Day: 10d    Patient seen and examined earlier today.  c/o pain around the suprapubic catheter insertion area since last night    PMH/PSH:  PAST MEDICAL & SURGICAL HISTORY:  Paraplegia  Chronic UTI  History of skin graft    VITALS:  T(F): 97.6 (08-23-19 @ 06:00), Max: 98.6 (08-22-19 @ 22:20)  HR: 74 (08-23-19 @ 06:00)  BP: 122/84 (08-23-19 @ 06:00) (109/76 - 130/80)  RR: 16 (08-23-19 @ 06:00)  SpO2: --    PHYSICAL EXAM:  GENERAL: NAD  HEENT: MMM  CVS:  RRR  CHEST/LUNG: Good air entry, no wheeze  ABD:  soft, NT/ND +bs  EXTREMITIES:  no edema  :  +suprapubic catheter, insertion site looks ok  NEURO: AOx3, paraplegia  skin:  Rt buttock surgical wound (POA)    TESTS & MEASUREMENTS:      RADIOLOGY & ADDITIONAL TESTS:    MEDICATIONS:  MEDICATIONS  (STANDING):  ascorbic acid 500 milliGRAM(s) Oral daily  baclofen 20 milliGRAM(s) Oral three times a day  chlorhexidine 4% Liquid 1 Application(s) Topical <User Schedule>  DULoxetine 30 milliGRAM(s) Oral daily  enoxaparin Injectable 40 milliGRAM(s) SubCutaneous daily  gabapentin 300 milliGRAM(s) Oral three times a day  pantoprazole    Tablet 40 milliGRAM(s) Oral before breakfast  phenazopyridine 100 milliGRAM(s) Oral every 8 hours  polyethylene glycol 3350 17 Gram(s) Oral <User Schedule>  QUEtiapine 50 milliGRAM(s) Oral at bedtime  senna 1 Tablet(s) Oral daily  traZODone 50 milliGRAM(s) Oral at bedtime  zinc sulfate 220 milliGRAM(s) Oral daily    MEDICATIONS  (PRN):  morphine  IR 15 milliGRAM(s) Oral every 4 hours PRN Severe Pain (7 - 10)      HOME MEDICATIONS:  baclofen 20 mg oral tablet (08-13)  DULoxetine 30 mg oral delayed release capsule (08-13)  gabapentin 300 mg oral capsule (08-13)  pantoprazole 40 mg oral delayed release tablet (08-13)  QUEtiapine 50 mg oral tablet (08-13)  Senna 8.6 mg oral tablet (08-13)  traZODone 50 mg oral tablet (08-13)  Vitamin C 500 mg oral tablet (08-13)  zinc sulfate 220 mg oral capsule (08-13)

## 2019-08-24 LAB
APPEARANCE UR: CLEAR — SIGNIFICANT CHANGE UP
BACTERIA # UR AUTO: ABNORMAL
BILIRUB UR-MCNC: NEGATIVE — SIGNIFICANT CHANGE UP
COLOR SPEC: YELLOW — SIGNIFICANT CHANGE UP
DIFF PNL FLD: ABNORMAL
EPI CELLS # UR: ABNORMAL /HPF
GLUCOSE UR QL: NEGATIVE MG/DL — SIGNIFICANT CHANGE UP
KETONES UR-MCNC: ABNORMAL
LEUKOCYTE ESTERASE UR-ACNC: ABNORMAL
NITRITE UR-MCNC: POSITIVE
PH UR: 6 — SIGNIFICANT CHANGE UP (ref 5–8)
PROT UR-MCNC: 100 MG/DL
RBC CASTS # UR COMP ASSIST: ABNORMAL /HPF
SP GR SPEC: 1.02 — SIGNIFICANT CHANGE UP (ref 1.01–1.03)
UROBILINOGEN FLD QL: 0.2 MG/DL — SIGNIFICANT CHANGE UP (ref 0.2–0.2)
WBC UR QL: ABNORMAL /HPF

## 2019-08-24 PROCEDURE — 99232 SBSQ HOSP IP/OBS MODERATE 35: CPT

## 2019-08-24 RX ORDER — ONDANSETRON 8 MG/1
4 TABLET, FILM COATED ORAL EVERY 6 HOURS
Refills: 0 | Status: DISCONTINUED | OUTPATIENT
Start: 2019-08-24 | End: 2019-09-11

## 2019-08-24 RX ADMIN — Medication 100 MILLIGRAM(S): at 05:35

## 2019-08-24 RX ADMIN — GABAPENTIN 300 MILLIGRAM(S): 400 CAPSULE ORAL at 14:37

## 2019-08-24 RX ADMIN — Medication 500 MILLIGRAM(S): at 11:20

## 2019-08-24 RX ADMIN — MORPHINE SULFATE 15 MILLIGRAM(S): 50 CAPSULE, EXTENDED RELEASE ORAL at 14:37

## 2019-08-24 RX ADMIN — ZINC SULFATE TAB 220 MG (50 MG ZINC EQUIVALENT) 220 MILLIGRAM(S): 220 (50 ZN) TAB at 11:20

## 2019-08-24 RX ADMIN — Medication 20 MILLIGRAM(S): at 21:24

## 2019-08-24 RX ADMIN — MORPHINE SULFATE 15 MILLIGRAM(S): 50 CAPSULE, EXTENDED RELEASE ORAL at 09:17

## 2019-08-24 RX ADMIN — GABAPENTIN 300 MILLIGRAM(S): 400 CAPSULE ORAL at 05:35

## 2019-08-24 RX ADMIN — MORPHINE SULFATE 15 MILLIGRAM(S): 50 CAPSULE, EXTENDED RELEASE ORAL at 20:41

## 2019-08-24 RX ADMIN — DULOXETINE HYDROCHLORIDE 30 MILLIGRAM(S): 30 CAPSULE, DELAYED RELEASE ORAL at 11:20

## 2019-08-24 RX ADMIN — MORPHINE SULFATE 15 MILLIGRAM(S): 50 CAPSULE, EXTENDED RELEASE ORAL at 10:28

## 2019-08-24 RX ADMIN — PANTOPRAZOLE SODIUM 40 MILLIGRAM(S): 20 TABLET, DELAYED RELEASE ORAL at 05:35

## 2019-08-24 RX ADMIN — Medication 100 MILLIGRAM(S): at 14:37

## 2019-08-24 RX ADMIN — Medication 20 MILLIGRAM(S): at 14:37

## 2019-08-24 RX ADMIN — Medication 100 MILLIGRAM(S): at 21:25

## 2019-08-24 RX ADMIN — SENNA PLUS 1 TABLET(S): 8.6 TABLET ORAL at 11:20

## 2019-08-24 RX ADMIN — GABAPENTIN 300 MILLIGRAM(S): 400 CAPSULE ORAL at 21:24

## 2019-08-24 RX ADMIN — MORPHINE SULFATE 15 MILLIGRAM(S): 50 CAPSULE, EXTENDED RELEASE ORAL at 15:05

## 2019-08-24 RX ADMIN — MORPHINE SULFATE 15 MILLIGRAM(S): 50 CAPSULE, EXTENDED RELEASE ORAL at 21:25

## 2019-08-24 RX ADMIN — POLYETHYLENE GLYCOL 3350 17 GRAM(S): 17 POWDER, FOR SOLUTION ORAL at 09:17

## 2019-08-24 RX ADMIN — CHLORHEXIDINE GLUCONATE 1 APPLICATION(S): 213 SOLUTION TOPICAL at 05:33

## 2019-08-24 RX ADMIN — MORPHINE SULFATE 15 MILLIGRAM(S): 50 CAPSULE, EXTENDED RELEASE ORAL at 00:14

## 2019-08-24 RX ADMIN — Medication 20 MILLIGRAM(S): at 05:33

## 2019-08-24 RX ADMIN — ONDANSETRON 4 MILLIGRAM(S): 8 TABLET, FILM COATED ORAL at 15:39

## 2019-08-24 NOTE — PROGRESS NOTE ADULT - SUBJECTIVE AND OBJECTIVE BOX
CAITYMARCELINO CHAUDHRY  34y, Male  Allergy: ibuprofen (Hives; Rash)    Hospital Day: 11d    Patient seen and examined earlier today.  no new issues    PMH/PSH:  PAST MEDICAL & SURGICAL HISTORY:  Paraplegia  Chronic UTI  History of skin graft    VITALS:  T(F): 98 (08-24-19 @ 05:33), Max: 98 (08-24-19 @ 05:33)  HR: 87 (08-24-19 @ 05:33)  BP: 119/84 (08-24-19 @ 05:33) (106/65 - 119/84)  RR: 16 (08-24-19 @ 05:33)  SpO2: --    PHYSICAL EXAM:  GENERAL: NAD  HEENT: MMM  CVS:  RRR  CHEST/LUNG: Good air entry, no wheeze  ABD:  soft, NT/ND +bs  EXTREMITIES:  no edema  NEURO: AOx3, b/l paraplegia  skin:  rt buttock wound    TESTS & MEASUREMENTS:    RADIOLOGY & ADDITIONAL TESTS:    MEDICATIONS:  MEDICATIONS  (STANDING):  ascorbic acid 500 milliGRAM(s) Oral daily  baclofen 20 milliGRAM(s) Oral three times a day  chlorhexidine 4% Liquid 1 Application(s) Topical <User Schedule>  DULoxetine 30 milliGRAM(s) Oral daily  enoxaparin Injectable 40 milliGRAM(s) SubCutaneous daily  gabapentin 300 milliGRAM(s) Oral three times a day  pantoprazole    Tablet 40 milliGRAM(s) Oral before breakfast  phenazopyridine 100 milliGRAM(s) Oral every 8 hours  polyethylene glycol 3350 17 Gram(s) Oral <User Schedule>  QUEtiapine 50 milliGRAM(s) Oral at bedtime  senna 1 Tablet(s) Oral daily  traZODone 50 milliGRAM(s) Oral at bedtime  zinc sulfate 220 milliGRAM(s) Oral daily    MEDICATIONS  (PRN):  morphine  IR 15 milliGRAM(s) Oral every 4 hours PRN Severe Pain (7 - 10)      HOME MEDICATIONS:  baclofen 20 mg oral tablet (08-13)  DULoxetine 30 mg oral delayed release capsule (08-13)  gabapentin 300 mg oral capsule (08-13)  pantoprazole 40 mg oral delayed release tablet (08-13)  QUEtiapine 50 mg oral tablet (08-13)  Senna 8.6 mg oral tablet (08-13)  traZODone 50 mg oral tablet (08-13)  Vitamin C 500 mg oral tablet (08-13)  zinc sulfate 220 mg oral capsule (08-13)

## 2019-08-25 PROCEDURE — 99232 SBSQ HOSP IP/OBS MODERATE 35: CPT

## 2019-08-25 RX ADMIN — Medication 1 TABLET(S): at 17:06

## 2019-08-25 RX ADMIN — QUETIAPINE FUMARATE 50 MILLIGRAM(S): 200 TABLET, FILM COATED ORAL at 21:11

## 2019-08-25 RX ADMIN — MORPHINE SULFATE 15 MILLIGRAM(S): 50 CAPSULE, EXTENDED RELEASE ORAL at 16:16

## 2019-08-25 RX ADMIN — GABAPENTIN 300 MILLIGRAM(S): 400 CAPSULE ORAL at 21:11

## 2019-08-25 RX ADMIN — MORPHINE SULFATE 15 MILLIGRAM(S): 50 CAPSULE, EXTENDED RELEASE ORAL at 21:09

## 2019-08-25 RX ADMIN — Medication 20 MILLIGRAM(S): at 21:10

## 2019-08-25 RX ADMIN — Medication 20 MILLIGRAM(S): at 13:05

## 2019-08-25 RX ADMIN — GABAPENTIN 300 MILLIGRAM(S): 400 CAPSULE ORAL at 05:30

## 2019-08-25 RX ADMIN — CHLORHEXIDINE GLUCONATE 1 APPLICATION(S): 213 SOLUTION TOPICAL at 05:32

## 2019-08-25 RX ADMIN — ZINC SULFATE TAB 220 MG (50 MG ZINC EQUIVALENT) 220 MILLIGRAM(S): 220 (50 ZN) TAB at 11:06

## 2019-08-25 RX ADMIN — Medication 100 MILLIGRAM(S): at 21:12

## 2019-08-25 RX ADMIN — MORPHINE SULFATE 15 MILLIGRAM(S): 50 CAPSULE, EXTENDED RELEASE ORAL at 21:13

## 2019-08-25 RX ADMIN — Medication 100 MILLIGRAM(S): at 05:32

## 2019-08-25 RX ADMIN — Medication 20 MILLIGRAM(S): at 05:30

## 2019-08-25 RX ADMIN — GABAPENTIN 300 MILLIGRAM(S): 400 CAPSULE ORAL at 13:04

## 2019-08-25 RX ADMIN — MORPHINE SULFATE 15 MILLIGRAM(S): 50 CAPSULE, EXTENDED RELEASE ORAL at 16:00

## 2019-08-25 RX ADMIN — MORPHINE SULFATE 15 MILLIGRAM(S): 50 CAPSULE, EXTENDED RELEASE ORAL at 05:29

## 2019-08-25 RX ADMIN — Medication 500 MILLIGRAM(S): at 11:06

## 2019-08-25 RX ADMIN — MORPHINE SULFATE 15 MILLIGRAM(S): 50 CAPSULE, EXTENDED RELEASE ORAL at 12:32

## 2019-08-25 RX ADMIN — Medication 100 MILLIGRAM(S): at 13:05

## 2019-08-25 RX ADMIN — MORPHINE SULFATE 15 MILLIGRAM(S): 50 CAPSULE, EXTENDED RELEASE ORAL at 11:15

## 2019-08-25 RX ADMIN — MORPHINE SULFATE 15 MILLIGRAM(S): 50 CAPSULE, EXTENDED RELEASE ORAL at 05:32

## 2019-08-25 RX ADMIN — POLYETHYLENE GLYCOL 3350 17 GRAM(S): 17 POWDER, FOR SOLUTION ORAL at 17:06

## 2019-08-25 RX ADMIN — MORPHINE SULFATE 15 MILLIGRAM(S): 50 CAPSULE, EXTENDED RELEASE ORAL at 00:51

## 2019-08-25 RX ADMIN — PANTOPRAZOLE SODIUM 40 MILLIGRAM(S): 20 TABLET, DELAYED RELEASE ORAL at 08:40

## 2019-08-25 RX ADMIN — SENNA PLUS 1 TABLET(S): 8.6 TABLET ORAL at 11:06

## 2019-08-25 RX ADMIN — DULOXETINE HYDROCHLORIDE 30 MILLIGRAM(S): 30 CAPSULE, DELAYED RELEASE ORAL at 11:06

## 2019-08-25 RX ADMIN — Medication 50 MILLIGRAM(S): at 21:11
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
## 2019-08-25 NOTE — PROGRESS NOTE ADULT - SUBJECTIVE AND OBJECTIVE BOX
CAITY MARCELINO  34y, Male  Allergy: ibuprofen (Hives; Rash)    Hospital Day: 12d    Patient seen and examined earlier today.   c/o mild nausea, says that he is supposed to be on long term abx for UTI    PMH/PSH:  PAST MEDICAL & SURGICAL HISTORY:  Paraplegia  Chronic UTI  History of skin graft    VITALS:  T(F): 97.9 (19 @ 05:30), Max: 99.2 (19 @ 14:33)  HR: 111 (19 @ 05:30)  BP: 151/94 (19 @ 05:30) (121/92 - 151/94)  RR: 16 (19 @ 05:30)  SpO2: --    PHYSICAL EXAM:  GENERAL: NAD  HEENT: MMM  CVS:  RRR  CHEST/LUNG: Good air entry, no wheeze  ABD:  soft, NT/ND +bs  EXTREMITIES:  no edema  NEURO: AOx3    TESTS & MEASUREMENTS:      Urinalysis Basic - ( 24 Aug 2019 15:30 )    Color: Yellow / Appearance: Clear / S.020 / pH: x  Gluc: x / Ketone: Trace  / Bili: Negative / Urobili: 0.2 mg/dL   Blood: x / Protein: 100 mg/dL / Nitrite: Positive   Leuk Esterase: Moderate / RBC: 3-5 /HPF / WBC 26-50 /HPF   Sq Epi: x / Non Sq Epi: Occasional /HPF / Bacteria: Many    RADIOLOGY & ADDITIONAL TESTS:      MEDICATIONS:  MEDICATIONS  (STANDING):  ascorbic acid 500 milliGRAM(s) Oral daily  baclofen 20 milliGRAM(s) Oral three times a day  chlorhexidine 4% Liquid 1 Application(s) Topical <User Schedule>  DULoxetine 30 milliGRAM(s) Oral daily  enoxaparin Injectable 40 milliGRAM(s) SubCutaneous daily  gabapentin 300 milliGRAM(s) Oral three times a day  pantoprazole    Tablet 40 milliGRAM(s) Oral before breakfast  phenazopyridine 100 milliGRAM(s) Oral every 8 hours  polyethylene glycol 3350 17 Gram(s) Oral <User Schedule>  QUEtiapine 50 milliGRAM(s) Oral at bedtime  senna 1 Tablet(s) Oral daily  traZODone 50 milliGRAM(s) Oral at bedtime  trimethoprim  160 mG/sulfamethoxazole 800 mG 1 Tablet(s) Oral two times a day  zinc sulfate 220 milliGRAM(s) Oral daily    MEDICATIONS  (PRN):  morphine  IR 15 milliGRAM(s) Oral every 4 hours PRN Severe Pain (7 - 10)  ondansetron    Tablet 4 milliGRAM(s) Oral every 6 hours PRN Nausea and/or Vomiting      HOME MEDICATIONS:  baclofen 20 mg oral tablet ()  DULoxetine 30 mg oral delayed release capsule ()  gabapentin 300 mg oral capsule ()  pantoprazole 40 mg oral delayed release tablet ()  QUEtiapine 50 mg oral tablet ()  Senna 8.6 mg oral tablet ()  traZODone 50 mg oral tablet ()  Vitamin C 500 mg oral tablet ()  zinc sulfate 220 mg oral capsule ()

## 2019-08-26 LAB
CULTURE RESULTS: NO GROWTH — SIGNIFICANT CHANGE UP
SPECIMEN SOURCE: SIGNIFICANT CHANGE UP

## 2019-08-26 PROCEDURE — 99232 SBSQ HOSP IP/OBS MODERATE 35: CPT

## 2019-08-26 PROCEDURE — 71045 X-RAY EXAM CHEST 1 VIEW: CPT | Mod: 26

## 2019-08-26 RX ADMIN — POLYETHYLENE GLYCOL 3350 17 GRAM(S): 17 POWDER, FOR SOLUTION ORAL at 17:22

## 2019-08-26 RX ADMIN — MORPHINE SULFATE 15 MILLIGRAM(S): 50 CAPSULE, EXTENDED RELEASE ORAL at 23:28

## 2019-08-26 RX ADMIN — Medication 50 MILLIGRAM(S): at 21:01

## 2019-08-26 RX ADMIN — MORPHINE SULFATE 15 MILLIGRAM(S): 50 CAPSULE, EXTENDED RELEASE ORAL at 19:50

## 2019-08-26 RX ADMIN — Medication 500 MILLIGRAM(S): at 11:26

## 2019-08-26 RX ADMIN — MORPHINE SULFATE 15 MILLIGRAM(S): 50 CAPSULE, EXTENDED RELEASE ORAL at 18:37

## 2019-08-26 RX ADMIN — SENNA PLUS 1 TABLET(S): 8.6 TABLET ORAL at 11:25

## 2019-08-26 RX ADMIN — MORPHINE SULFATE 15 MILLIGRAM(S): 50 CAPSULE, EXTENDED RELEASE ORAL at 13:56

## 2019-08-26 RX ADMIN — POLYETHYLENE GLYCOL 3350 17 GRAM(S): 17 POWDER, FOR SOLUTION ORAL at 11:25

## 2019-08-26 RX ADMIN — Medication 20 MILLIGRAM(S): at 21:01

## 2019-08-26 RX ADMIN — ZINC SULFATE TAB 220 MG (50 MG ZINC EQUIVALENT) 220 MILLIGRAM(S): 220 (50 ZN) TAB at 11:25

## 2019-08-26 RX ADMIN — QUETIAPINE FUMARATE 50 MILLIGRAM(S): 200 TABLET, FILM COATED ORAL at 21:01

## 2019-08-26 RX ADMIN — Medication 100 MILLIGRAM(S): at 21:01

## 2019-08-26 RX ADMIN — Medication 1 TABLET(S): at 17:22

## 2019-08-26 RX ADMIN — Medication 100 MILLIGRAM(S): at 14:52

## 2019-08-26 RX ADMIN — GABAPENTIN 300 MILLIGRAM(S): 400 CAPSULE ORAL at 14:50

## 2019-08-26 RX ADMIN — DULOXETINE HYDROCHLORIDE 30 MILLIGRAM(S): 30 CAPSULE, DELAYED RELEASE ORAL at 11:25

## 2019-08-26 RX ADMIN — Medication 20 MILLIGRAM(S): at 14:50

## 2019-08-26 RX ADMIN — GABAPENTIN 300 MILLIGRAM(S): 400 CAPSULE ORAL at 21:01
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
## 2019-08-27 PROCEDURE — 99232 SBSQ HOSP IP/OBS MODERATE 35: CPT

## 2019-08-27 RX ORDER — MORPHINE SULFATE 50 MG/1
15 CAPSULE, EXTENDED RELEASE ORAL ONCE
Refills: 0 | Status: DISCONTINUED | OUTPATIENT
Start: 2019-08-27 | End: 2019-08-27

## 2019-08-27 RX ORDER — OXYCODONE AND ACETAMINOPHEN 5; 325 MG/1; MG/1
1 TABLET ORAL EVERY 6 HOURS
Refills: 0 | Status: DISCONTINUED | OUTPATIENT
Start: 2019-08-27 | End: 2019-08-28

## 2019-08-27 RX ADMIN — Medication 20 MILLIGRAM(S): at 13:21

## 2019-08-27 RX ADMIN — SENNA PLUS 1 TABLET(S): 8.6 TABLET ORAL at 13:19

## 2019-08-27 RX ADMIN — MORPHINE SULFATE 15 MILLIGRAM(S): 50 CAPSULE, EXTENDED RELEASE ORAL at 11:43

## 2019-08-27 RX ADMIN — MORPHINE SULFATE 15 MILLIGRAM(S): 50 CAPSULE, EXTENDED RELEASE ORAL at 23:18

## 2019-08-27 RX ADMIN — MORPHINE SULFATE 15 MILLIGRAM(S): 50 CAPSULE, EXTENDED RELEASE ORAL at 06:10

## 2019-08-27 RX ADMIN — Medication 100 MILLIGRAM(S): at 23:08

## 2019-08-27 RX ADMIN — Medication 100 MILLIGRAM(S): at 13:20

## 2019-08-27 RX ADMIN — ZINC SULFATE TAB 220 MG (50 MG ZINC EQUIVALENT) 220 MILLIGRAM(S): 220 (50 ZN) TAB at 13:19

## 2019-08-27 RX ADMIN — Medication 50 MILLIGRAM(S): at 23:07

## 2019-08-27 RX ADMIN — Medication 500 MILLIGRAM(S): at 13:19

## 2019-08-27 RX ADMIN — POLYETHYLENE GLYCOL 3350 17 GRAM(S): 17 POWDER, FOR SOLUTION ORAL at 11:43

## 2019-08-27 RX ADMIN — POLYETHYLENE GLYCOL 3350 17 GRAM(S): 17 POWDER, FOR SOLUTION ORAL at 17:42

## 2019-08-27 RX ADMIN — GABAPENTIN 300 MILLIGRAM(S): 400 CAPSULE ORAL at 06:07

## 2019-08-27 RX ADMIN — MORPHINE SULFATE 15 MILLIGRAM(S): 50 CAPSULE, EXTENDED RELEASE ORAL at 12:15

## 2019-08-27 RX ADMIN — PANTOPRAZOLE SODIUM 40 MILLIGRAM(S): 20 TABLET, DELAYED RELEASE ORAL at 06:07

## 2019-08-27 RX ADMIN — GABAPENTIN 300 MILLIGRAM(S): 400 CAPSULE ORAL at 13:20

## 2019-08-27 RX ADMIN — MORPHINE SULFATE 15 MILLIGRAM(S): 50 CAPSULE, EXTENDED RELEASE ORAL at 00:10

## 2019-08-27 RX ADMIN — QUETIAPINE FUMARATE 50 MILLIGRAM(S): 200 TABLET, FILM COATED ORAL at 23:07

## 2019-08-27 RX ADMIN — DULOXETINE HYDROCHLORIDE 30 MILLIGRAM(S): 30 CAPSULE, DELAYED RELEASE ORAL at 13:19

## 2019-08-27 RX ADMIN — Medication 20 MILLIGRAM(S): at 06:07

## 2019-08-27 RX ADMIN — Medication 20 MILLIGRAM(S): at 23:07

## 2019-08-27 RX ADMIN — OXYCODONE AND ACETAMINOPHEN 1 TABLET(S): 5; 325 TABLET ORAL at 16:10

## 2019-08-27 RX ADMIN — Medication 1 TABLET(S): at 06:07

## 2019-08-27 RX ADMIN — MORPHINE SULFATE 15 MILLIGRAM(S): 50 CAPSULE, EXTENDED RELEASE ORAL at 06:27

## 2019-08-27 RX ADMIN — GABAPENTIN 300 MILLIGRAM(S): 400 CAPSULE ORAL at 23:07

## 2019-08-27 RX ADMIN — Medication 1 TABLET(S): at 17:42

## 2019-08-27 RX ADMIN — Medication 100 MILLIGRAM(S): at 06:07
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
## 2019-08-28 PROCEDURE — 99233 SBSQ HOSP IP/OBS HIGH 50: CPT

## 2019-08-28 RX ORDER — ZINC SULFATE TAB 220 MG (50 MG ZINC EQUIVALENT) 220 (50 ZN) MG
1 TAB ORAL
Qty: 0 | Refills: 0 | DISCHARGE

## 2019-08-28 RX ORDER — PANTOPRAZOLE SODIUM 20 MG/1
1 TABLET, DELAYED RELEASE ORAL
Qty: 0 | Refills: 0 | DISCHARGE

## 2019-08-28 RX ORDER — ASCORBIC ACID 60 MG
1 TABLET,CHEWABLE ORAL
Qty: 0 | Refills: 0 | DISCHARGE

## 2019-08-28 RX ORDER — ASCORBIC ACID 60 MG
1 TABLET,CHEWABLE ORAL
Qty: 30 | Refills: 0
Start: 2019-08-28 | End: 2019-09-26

## 2019-08-28 RX ORDER — SENNA PLUS 8.6 MG/1
1 TABLET ORAL
Qty: 30 | Refills: 0
Start: 2019-08-28 | End: 2019-09-26

## 2019-08-28 RX ORDER — POLYETHYLENE GLYCOL 3350 17 G/17G
17 POWDER, FOR SOLUTION ORAL
Qty: 550 | Refills: 0
Start: 2019-08-28 | End: 2019-09-26

## 2019-08-28 RX ORDER — MORPHINE SULFATE 50 MG/1
1 CAPSULE, EXTENDED RELEASE ORAL
Qty: 42 | Refills: 0
Start: 2019-08-28 | End: 2019-09-03

## 2019-08-28 RX ORDER — GABAPENTIN 400 MG/1
1 CAPSULE ORAL
Qty: 90 | Refills: 0
Start: 2019-08-28 | End: 2019-09-26

## 2019-08-28 RX ORDER — QUETIAPINE FUMARATE 200 MG/1
1 TABLET, FILM COATED ORAL
Qty: 0 | Refills: 0 | DISCHARGE

## 2019-08-28 RX ORDER — BACLOFEN 100 %
1 POWDER (GRAM) MISCELLANEOUS
Qty: 0 | Refills: 0 | DISCHARGE

## 2019-08-28 RX ORDER — PHENAZOPYRIDINE HCL 100 MG
1 TABLET ORAL
Qty: 90 | Refills: 0
Start: 2019-08-28 | End: 2019-09-26

## 2019-08-28 RX ORDER — PANTOPRAZOLE SODIUM 20 MG/1
1 TABLET, DELAYED RELEASE ORAL
Qty: 30 | Refills: 0
Start: 2019-08-28 | End: 2019-09-26

## 2019-08-28 RX ORDER — PHENAZOPYRIDINE HCL 100 MG
1 TABLET ORAL
Qty: 0 | Refills: 0 | DISCHARGE
Start: 2019-08-28

## 2019-08-28 RX ORDER — SENNA PLUS 8.6 MG/1
1 TABLET ORAL
Qty: 0 | Refills: 0 | DISCHARGE

## 2019-08-28 RX ORDER — TRAZODONE HCL 50 MG
0 TABLET ORAL
Qty: 0 | Refills: 0 | DISCHARGE

## 2019-08-28 RX ORDER — ZINC SULFATE TAB 220 MG (50 MG ZINC EQUIVALENT) 220 (50 ZN) MG
1 TAB ORAL
Qty: 90 | Refills: 0
Start: 2019-08-28 | End: 2019-09-26

## 2019-08-28 RX ORDER — QUETIAPINE FUMARATE 200 MG/1
1 TABLET, FILM COATED ORAL
Qty: 30 | Refills: 0
Start: 2019-08-28 | End: 2019-09-26

## 2019-08-28 RX ORDER — DULOXETINE HYDROCHLORIDE 30 MG/1
1 CAPSULE, DELAYED RELEASE ORAL
Qty: 30 | Refills: 0
Start: 2019-08-28 | End: 2019-09-26

## 2019-08-28 RX ORDER — DULOXETINE HYDROCHLORIDE 30 MG/1
1 CAPSULE, DELAYED RELEASE ORAL
Qty: 0 | Refills: 0 | DISCHARGE

## 2019-08-28 RX ORDER — ACETAMINOPHEN 500 MG
2 TABLET ORAL
Qty: 240 | Refills: 0
Start: 2019-08-28 | End: 2019-09-26

## 2019-08-28 RX ORDER — TRAZODONE HCL 50 MG
1 TABLET ORAL
Qty: 30 | Refills: 0
Start: 2019-08-28 | End: 2019-09-26

## 2019-08-28 RX ORDER — BACLOFEN 100 %
1 POWDER (GRAM) MISCELLANEOUS
Qty: 90 | Refills: 0
Start: 2019-08-28 | End: 2019-09-26

## 2019-08-28 RX ORDER — MORPHINE SULFATE 50 MG/1
1 CAPSULE, EXTENDED RELEASE ORAL
Qty: 180 | Refills: 0
Start: 2019-08-28 | End: 2019-09-26

## 2019-08-28 RX ORDER — MORPHINE SULFATE 50 MG/1
15 CAPSULE, EXTENDED RELEASE ORAL EVERY 4 HOURS
Refills: 0 | Status: DISCONTINUED | OUTPATIENT
Start: 2019-08-28 | End: 2019-09-04

## 2019-08-28 RX ORDER — GABAPENTIN 400 MG/1
1 CAPSULE ORAL
Qty: 0 | Refills: 0 | DISCHARGE

## 2019-08-28 RX ADMIN — GABAPENTIN 300 MILLIGRAM(S): 400 CAPSULE ORAL at 15:00

## 2019-08-28 RX ADMIN — Medication 100 MILLIGRAM(S): at 21:52

## 2019-08-28 RX ADMIN — Medication 20 MILLIGRAM(S): at 06:46

## 2019-08-28 RX ADMIN — Medication 500 MILLIGRAM(S): at 11:12

## 2019-08-28 RX ADMIN — Medication 100 MILLIGRAM(S): at 06:46

## 2019-08-28 RX ADMIN — DULOXETINE HYDROCHLORIDE 30 MILLIGRAM(S): 30 CAPSULE, DELAYED RELEASE ORAL at 11:12

## 2019-08-28 RX ADMIN — Medication 50 MILLIGRAM(S): at 21:52

## 2019-08-28 RX ADMIN — Medication 20 MILLIGRAM(S): at 15:00

## 2019-08-28 RX ADMIN — MORPHINE SULFATE 15 MILLIGRAM(S): 50 CAPSULE, EXTENDED RELEASE ORAL at 23:31

## 2019-08-28 RX ADMIN — QUETIAPINE FUMARATE 50 MILLIGRAM(S): 200 TABLET, FILM COATED ORAL at 21:51

## 2019-08-28 RX ADMIN — MORPHINE SULFATE 15 MILLIGRAM(S): 50 CAPSULE, EXTENDED RELEASE ORAL at 15:20

## 2019-08-28 RX ADMIN — Medication 20 MILLIGRAM(S): at 21:51

## 2019-08-28 RX ADMIN — GABAPENTIN 300 MILLIGRAM(S): 400 CAPSULE ORAL at 21:51

## 2019-08-28 RX ADMIN — MORPHINE SULFATE 15 MILLIGRAM(S): 50 CAPSULE, EXTENDED RELEASE ORAL at 19:01

## 2019-08-28 RX ADMIN — MORPHINE SULFATE 15 MILLIGRAM(S): 50 CAPSULE, EXTENDED RELEASE ORAL at 14:59

## 2019-08-28 RX ADMIN — MORPHINE SULFATE 15 MILLIGRAM(S): 50 CAPSULE, EXTENDED RELEASE ORAL at 19:20

## 2019-08-28 RX ADMIN — GABAPENTIN 300 MILLIGRAM(S): 400 CAPSULE ORAL at 06:45

## 2019-08-28 RX ADMIN — POLYETHYLENE GLYCOL 3350 17 GRAM(S): 17 POWDER, FOR SOLUTION ORAL at 17:12

## 2019-08-28 RX ADMIN — ZINC SULFATE TAB 220 MG (50 MG ZINC EQUIVALENT) 220 MILLIGRAM(S): 220 (50 ZN) TAB at 11:13

## 2019-08-28 RX ADMIN — Medication 1 TABLET(S): at 17:12

## 2019-08-28 RX ADMIN — OXYCODONE AND ACETAMINOPHEN 1 TABLET(S): 5; 325 TABLET ORAL at 06:45

## 2019-08-28 RX ADMIN — PANTOPRAZOLE SODIUM 40 MILLIGRAM(S): 20 TABLET, DELAYED RELEASE ORAL at 06:45

## 2019-08-28 RX ADMIN — Medication 1 TABLET(S): at 06:46

## 2019-08-28 RX ADMIN — SENNA PLUS 1 TABLET(S): 8.6 TABLET ORAL at 11:12

## 2019-08-28 RX ADMIN — POLYETHYLENE GLYCOL 3350 17 GRAM(S): 17 POWDER, FOR SOLUTION ORAL at 11:13

## 2019-08-28 RX ADMIN — MORPHINE SULFATE 15 MILLIGRAM(S): 50 CAPSULE, EXTENDED RELEASE ORAL at 00:22

## 2019-08-28 RX ADMIN — Medication 100 MILLIGRAM(S): at 15:00
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
## 2019-08-28 NOTE — CHART NOTE - NSCHARTNOTEFT_GEN_A_CORE
Limited Registered Dietitian Follow-up    Pt continues on a regular diet with J[employer] TID. Pt with good appetite and PO intake ,~75% of meal trays. Reports regular BM pattern. Pt with pressure injuries to R sacrum, buttocks, and R lateral hip. No changes in chew/swallow ability. Pt continues not at risk f/u 7 days Limited Registered Dietitian Follow-up    Pt continues on a regular diet with [employer]ordon TID. Pt with good appetite and PO intake ,~75% of meal trays. Reports regular BM pattern. Pt with pressure injuries to R sacrum/buttocks, and R lateral hip. No changes in chew/swallow ability. Pt continues not at risk f/u 7 days

## 2019-08-29 PROCEDURE — 99232 SBSQ HOSP IP/OBS MODERATE 35: CPT

## 2019-08-29 RX ADMIN — PANTOPRAZOLE SODIUM 40 MILLIGRAM(S): 20 TABLET, DELAYED RELEASE ORAL at 05:20

## 2019-08-29 RX ADMIN — DULOXETINE HYDROCHLORIDE 30 MILLIGRAM(S): 30 CAPSULE, DELAYED RELEASE ORAL at 11:21

## 2019-08-29 RX ADMIN — QUETIAPINE FUMARATE 50 MILLIGRAM(S): 200 TABLET, FILM COATED ORAL at 21:10

## 2019-08-29 RX ADMIN — MORPHINE SULFATE 15 MILLIGRAM(S): 50 CAPSULE, EXTENDED RELEASE ORAL at 16:35

## 2019-08-29 RX ADMIN — SENNA PLUS 1 TABLET(S): 8.6 TABLET ORAL at 11:20

## 2019-08-29 RX ADMIN — MORPHINE SULFATE 15 MILLIGRAM(S): 50 CAPSULE, EXTENDED RELEASE ORAL at 09:59

## 2019-08-29 RX ADMIN — CHLORHEXIDINE GLUCONATE 1 APPLICATION(S): 213 SOLUTION TOPICAL at 05:22

## 2019-08-29 RX ADMIN — GABAPENTIN 300 MILLIGRAM(S): 400 CAPSULE ORAL at 15:04

## 2019-08-29 RX ADMIN — Medication 500 MILLIGRAM(S): at 11:21

## 2019-08-29 RX ADMIN — ZINC SULFATE TAB 220 MG (50 MG ZINC EQUIVALENT) 220 MILLIGRAM(S): 220 (50 ZN) TAB at 11:20

## 2019-08-29 RX ADMIN — MORPHINE SULFATE 15 MILLIGRAM(S): 50 CAPSULE, EXTENDED RELEASE ORAL at 10:15

## 2019-08-29 RX ADMIN — MORPHINE SULFATE 15 MILLIGRAM(S): 50 CAPSULE, EXTENDED RELEASE ORAL at 04:25

## 2019-08-29 RX ADMIN — MORPHINE SULFATE 15 MILLIGRAM(S): 50 CAPSULE, EXTENDED RELEASE ORAL at 21:46

## 2019-08-29 RX ADMIN — Medication 20 MILLIGRAM(S): at 21:10

## 2019-08-29 RX ADMIN — Medication 50 MILLIGRAM(S): at 21:10

## 2019-08-29 RX ADMIN — MORPHINE SULFATE 15 MILLIGRAM(S): 50 CAPSULE, EXTENDED RELEASE ORAL at 00:44

## 2019-08-29 RX ADMIN — MORPHINE SULFATE 15 MILLIGRAM(S): 50 CAPSULE, EXTENDED RELEASE ORAL at 05:25

## 2019-08-29 RX ADMIN — MORPHINE SULFATE 15 MILLIGRAM(S): 50 CAPSULE, EXTENDED RELEASE ORAL at 16:16

## 2019-08-29 RX ADMIN — Medication 20 MILLIGRAM(S): at 15:04

## 2019-08-29 RX ADMIN — GABAPENTIN 300 MILLIGRAM(S): 400 CAPSULE ORAL at 21:10

## 2019-08-29 RX ADMIN — Medication 1 TABLET(S): at 05:21

## 2019-08-29 RX ADMIN — Medication 100 MILLIGRAM(S): at 15:04

## 2019-08-29 RX ADMIN — Medication 20 MILLIGRAM(S): at 05:21

## 2019-08-29 RX ADMIN — Medication 100 MILLIGRAM(S): at 05:22

## 2019-08-29 RX ADMIN — Medication 1 TABLET(S): at 17:18

## 2019-08-29 RX ADMIN — Medication 100 MILLIGRAM(S): at 21:10

## 2019-08-29 RX ADMIN — GABAPENTIN 300 MILLIGRAM(S): 400 CAPSULE ORAL at 05:21

## 2019-08-29 RX ADMIN — MORPHINE SULFATE 15 MILLIGRAM(S): 50 CAPSULE, EXTENDED RELEASE ORAL at 21:18
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
## 2019-08-30 PROCEDURE — 99232 SBSQ HOSP IP/OBS MODERATE 35: CPT

## 2019-08-30 RX ADMIN — GABAPENTIN 300 MILLIGRAM(S): 400 CAPSULE ORAL at 13:59

## 2019-08-30 RX ADMIN — MORPHINE SULFATE 15 MILLIGRAM(S): 50 CAPSULE, EXTENDED RELEASE ORAL at 21:20

## 2019-08-30 RX ADMIN — Medication 100 MILLIGRAM(S): at 05:04

## 2019-08-30 RX ADMIN — DULOXETINE HYDROCHLORIDE 30 MILLIGRAM(S): 30 CAPSULE, DELAYED RELEASE ORAL at 11:07

## 2019-08-30 RX ADMIN — MORPHINE SULFATE 15 MILLIGRAM(S): 50 CAPSULE, EXTENDED RELEASE ORAL at 13:59

## 2019-08-30 RX ADMIN — ONDANSETRON 4 MILLIGRAM(S): 8 TABLET, FILM COATED ORAL at 15:38

## 2019-08-30 RX ADMIN — MORPHINE SULFATE 15 MILLIGRAM(S): 50 CAPSULE, EXTENDED RELEASE ORAL at 02:23

## 2019-08-30 RX ADMIN — MORPHINE SULFATE 15 MILLIGRAM(S): 50 CAPSULE, EXTENDED RELEASE ORAL at 21:40

## 2019-08-30 RX ADMIN — GABAPENTIN 300 MILLIGRAM(S): 400 CAPSULE ORAL at 05:04

## 2019-08-30 RX ADMIN — Medication 50 MILLIGRAM(S): at 21:16

## 2019-08-30 RX ADMIN — SENNA PLUS 1 TABLET(S): 8.6 TABLET ORAL at 11:07

## 2019-08-30 RX ADMIN — Medication 100 MILLIGRAM(S): at 21:16

## 2019-08-30 RX ADMIN — PANTOPRAZOLE SODIUM 40 MILLIGRAM(S): 20 TABLET, DELAYED RELEASE ORAL at 05:04

## 2019-08-30 RX ADMIN — Medication 20 MILLIGRAM(S): at 21:16

## 2019-08-30 RX ADMIN — Medication 500 MILLIGRAM(S): at 11:07

## 2019-08-30 RX ADMIN — MORPHINE SULFATE 15 MILLIGRAM(S): 50 CAPSULE, EXTENDED RELEASE ORAL at 03:23

## 2019-08-30 RX ADMIN — Medication 1 TABLET(S): at 05:04

## 2019-08-30 RX ADMIN — Medication 20 MILLIGRAM(S): at 05:03

## 2019-08-30 RX ADMIN — Medication 100 MILLIGRAM(S): at 13:59

## 2019-08-30 RX ADMIN — MORPHINE SULFATE 15 MILLIGRAM(S): 50 CAPSULE, EXTENDED RELEASE ORAL at 08:55

## 2019-08-30 RX ADMIN — GABAPENTIN 300 MILLIGRAM(S): 400 CAPSULE ORAL at 21:16

## 2019-08-30 RX ADMIN — Medication 1 TABLET(S): at 18:00

## 2019-08-30 RX ADMIN — Medication 20 MILLIGRAM(S): at 13:59

## 2019-08-30 RX ADMIN — QUETIAPINE FUMARATE 50 MILLIGRAM(S): 200 TABLET, FILM COATED ORAL at 21:15

## 2019-08-30 RX ADMIN — ZINC SULFATE TAB 220 MG (50 MG ZINC EQUIVALENT) 220 MILLIGRAM(S): 220 (50 ZN) TAB at 11:07

## 2019-08-30 RX ADMIN — MORPHINE SULFATE 15 MILLIGRAM(S): 50 CAPSULE, EXTENDED RELEASE ORAL at 14:26

## 2019-08-30 RX ADMIN — ONDANSETRON 4 MILLIGRAM(S): 8 TABLET, FILM COATED ORAL at 21:51

## 2019-08-30 RX ADMIN — MORPHINE SULFATE 15 MILLIGRAM(S): 50 CAPSULE, EXTENDED RELEASE ORAL at 09:05
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
## 2019-08-31 PROCEDURE — 99232 SBSQ HOSP IP/OBS MODERATE 35: CPT

## 2019-08-31 RX ADMIN — GABAPENTIN 300 MILLIGRAM(S): 400 CAPSULE ORAL at 22:15

## 2019-08-31 RX ADMIN — GABAPENTIN 300 MILLIGRAM(S): 400 CAPSULE ORAL at 05:53

## 2019-08-31 RX ADMIN — Medication 1 TABLET(S): at 05:54

## 2019-08-31 RX ADMIN — Medication 20 MILLIGRAM(S): at 05:53

## 2019-08-31 RX ADMIN — MORPHINE SULFATE 15 MILLIGRAM(S): 50 CAPSULE, EXTENDED RELEASE ORAL at 13:35

## 2019-08-31 RX ADMIN — DULOXETINE HYDROCHLORIDE 30 MILLIGRAM(S): 30 CAPSULE, DELAYED RELEASE ORAL at 12:30

## 2019-08-31 RX ADMIN — Medication 100 MILLIGRAM(S): at 05:53

## 2019-08-31 RX ADMIN — PANTOPRAZOLE SODIUM 40 MILLIGRAM(S): 20 TABLET, DELAYED RELEASE ORAL at 05:53

## 2019-08-31 RX ADMIN — Medication 20 MILLIGRAM(S): at 22:15

## 2019-08-31 RX ADMIN — MORPHINE SULFATE 15 MILLIGRAM(S): 50 CAPSULE, EXTENDED RELEASE ORAL at 20:14

## 2019-08-31 RX ADMIN — Medication 20 MILLIGRAM(S): at 13:04

## 2019-08-31 RX ADMIN — SENNA PLUS 1 TABLET(S): 8.6 TABLET ORAL at 12:30

## 2019-08-31 RX ADMIN — MORPHINE SULFATE 15 MILLIGRAM(S): 50 CAPSULE, EXTENDED RELEASE ORAL at 19:59

## 2019-08-31 RX ADMIN — ZINC SULFATE TAB 220 MG (50 MG ZINC EQUIVALENT) 220 MILLIGRAM(S): 220 (50 ZN) TAB at 12:30

## 2019-08-31 RX ADMIN — Medication 1 TABLET(S): at 17:25

## 2019-08-31 RX ADMIN — Medication 500 MILLIGRAM(S): at 12:30

## 2019-08-31 RX ADMIN — POLYETHYLENE GLYCOL 3350 17 GRAM(S): 17 POWDER, FOR SOLUTION ORAL at 12:30

## 2019-08-31 RX ADMIN — MORPHINE SULFATE 15 MILLIGRAM(S): 50 CAPSULE, EXTENDED RELEASE ORAL at 13:02

## 2019-08-31 RX ADMIN — Medication 50 MILLIGRAM(S): at 22:15

## 2019-08-31 RX ADMIN — POLYETHYLENE GLYCOL 3350 17 GRAM(S): 17 POWDER, FOR SOLUTION ORAL at 17:25

## 2019-08-31 RX ADMIN — Medication 100 MILLIGRAM(S): at 13:04

## 2019-08-31 RX ADMIN — Medication 100 MILLIGRAM(S): at 22:15

## 2019-08-31 RX ADMIN — QUETIAPINE FUMARATE 50 MILLIGRAM(S): 200 TABLET, FILM COATED ORAL at 22:15

## 2019-08-31 RX ADMIN — GABAPENTIN 300 MILLIGRAM(S): 400 CAPSULE ORAL at 13:04
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
## 2019-09-01 PROCEDURE — 99232 SBSQ HOSP IP/OBS MODERATE 35: CPT

## 2019-09-01 RX ADMIN — ENOXAPARIN SODIUM 40 MILLIGRAM(S): 100 INJECTION SUBCUTANEOUS at 10:15

## 2019-09-01 RX ADMIN — PANTOPRAZOLE SODIUM 40 MILLIGRAM(S): 20 TABLET, DELAYED RELEASE ORAL at 06:35

## 2019-09-01 RX ADMIN — Medication 100 MILLIGRAM(S): at 13:18

## 2019-09-01 RX ADMIN — MORPHINE SULFATE 15 MILLIGRAM(S): 50 CAPSULE, EXTENDED RELEASE ORAL at 12:53

## 2019-09-01 RX ADMIN — MORPHINE SULFATE 15 MILLIGRAM(S): 50 CAPSULE, EXTENDED RELEASE ORAL at 01:43

## 2019-09-01 RX ADMIN — Medication 1 TABLET(S): at 17:02

## 2019-09-01 RX ADMIN — GABAPENTIN 300 MILLIGRAM(S): 400 CAPSULE ORAL at 13:01

## 2019-09-01 RX ADMIN — POLYETHYLENE GLYCOL 3350 17 GRAM(S): 17 POWDER, FOR SOLUTION ORAL at 17:02

## 2019-09-01 RX ADMIN — GABAPENTIN 300 MILLIGRAM(S): 400 CAPSULE ORAL at 22:09

## 2019-09-01 RX ADMIN — Medication 20 MILLIGRAM(S): at 22:09

## 2019-09-01 RX ADMIN — MORPHINE SULFATE 15 MILLIGRAM(S): 50 CAPSULE, EXTENDED RELEASE ORAL at 22:09

## 2019-09-01 RX ADMIN — OXYCODONE AND ACETAMINOPHEN 1 TABLET(S): 5; 325 TABLET ORAL at 06:36

## 2019-09-01 RX ADMIN — Medication 20 MILLIGRAM(S): at 06:35

## 2019-09-01 RX ADMIN — GABAPENTIN 300 MILLIGRAM(S): 400 CAPSULE ORAL at 06:35

## 2019-09-01 RX ADMIN — POLYETHYLENE GLYCOL 3350 17 GRAM(S): 17 POWDER, FOR SOLUTION ORAL at 09:15

## 2019-09-01 RX ADMIN — QUETIAPINE FUMARATE 50 MILLIGRAM(S): 200 TABLET, FILM COATED ORAL at 22:09

## 2019-09-01 RX ADMIN — MORPHINE SULFATE 15 MILLIGRAM(S): 50 CAPSULE, EXTENDED RELEASE ORAL at 17:08

## 2019-09-01 RX ADMIN — MORPHINE SULFATE 15 MILLIGRAM(S): 50 CAPSULE, EXTENDED RELEASE ORAL at 12:45

## 2019-09-01 RX ADMIN — ZINC SULFATE TAB 220 MG (50 MG ZINC EQUIVALENT) 220 MILLIGRAM(S): 220 (50 ZN) TAB at 10:15

## 2019-09-01 RX ADMIN — SENNA PLUS 1 TABLET(S): 8.6 TABLET ORAL at 10:15

## 2019-09-01 RX ADMIN — Medication 100 MILLIGRAM(S): at 22:10

## 2019-09-01 RX ADMIN — DULOXETINE HYDROCHLORIDE 30 MILLIGRAM(S): 30 CAPSULE, DELAYED RELEASE ORAL at 10:14

## 2019-09-01 RX ADMIN — MORPHINE SULFATE 15 MILLIGRAM(S): 50 CAPSULE, EXTENDED RELEASE ORAL at 00:37

## 2019-09-01 RX ADMIN — Medication 20 MILLIGRAM(S): at 13:00

## 2019-09-01 RX ADMIN — Medication 1 TABLET(S): at 06:35

## 2019-09-01 RX ADMIN — Medication 50 MILLIGRAM(S): at 22:09

## 2019-09-01 RX ADMIN — Medication 500 MILLIGRAM(S): at 10:14

## 2019-09-01 RX ADMIN — MORPHINE SULFATE 15 MILLIGRAM(S): 50 CAPSULE, EXTENDED RELEASE ORAL at 06:48

## 2019-09-01 RX ADMIN — MORPHINE SULFATE 15 MILLIGRAM(S): 50 CAPSULE, EXTENDED RELEASE ORAL at 23:13

## 2019-09-01 RX ADMIN — MORPHINE SULFATE 15 MILLIGRAM(S): 50 CAPSULE, EXTENDED RELEASE ORAL at 07:15

## 2019-09-01 RX ADMIN — Medication 100 MILLIGRAM(S): at 06:36

## 2019-09-01 NOTE — CHART NOTE - NSCHARTNOTEFT_GEN_A_CORE
Patient has Cystostomy Tube: 18 G        **Sterile technique used:  Insertion site cleansed with Betadine , 18G emmanuel reinserted,  10ml sterile water inserted into balloon.
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
## 2019-09-02 PROCEDURE — 99232 SBSQ HOSP IP/OBS MODERATE 35: CPT

## 2019-09-02 RX ADMIN — MORPHINE SULFATE 15 MILLIGRAM(S): 50 CAPSULE, EXTENDED RELEASE ORAL at 17:24

## 2019-09-02 RX ADMIN — Medication 1 TABLET(S): at 05:17

## 2019-09-02 RX ADMIN — MORPHINE SULFATE 15 MILLIGRAM(S): 50 CAPSULE, EXTENDED RELEASE ORAL at 16:43

## 2019-09-02 RX ADMIN — MORPHINE SULFATE 15 MILLIGRAM(S): 50 CAPSULE, EXTENDED RELEASE ORAL at 10:36

## 2019-09-02 RX ADMIN — MORPHINE SULFATE 15 MILLIGRAM(S): 50 CAPSULE, EXTENDED RELEASE ORAL at 05:20

## 2019-09-02 RX ADMIN — MORPHINE SULFATE 15 MILLIGRAM(S): 50 CAPSULE, EXTENDED RELEASE ORAL at 21:16

## 2019-09-02 RX ADMIN — DULOXETINE HYDROCHLORIDE 30 MILLIGRAM(S): 30 CAPSULE, DELAYED RELEASE ORAL at 10:36

## 2019-09-02 RX ADMIN — Medication 50 MILLIGRAM(S): at 21:13

## 2019-09-02 RX ADMIN — MORPHINE SULFATE 15 MILLIGRAM(S): 50 CAPSULE, EXTENDED RELEASE ORAL at 22:00

## 2019-09-02 RX ADMIN — Medication 100 MILLIGRAM(S): at 05:20

## 2019-09-02 RX ADMIN — PANTOPRAZOLE SODIUM 40 MILLIGRAM(S): 20 TABLET, DELAYED RELEASE ORAL at 05:18

## 2019-09-02 RX ADMIN — SENNA PLUS 1 TABLET(S): 8.6 TABLET ORAL at 10:36

## 2019-09-02 RX ADMIN — POLYETHYLENE GLYCOL 3350 17 GRAM(S): 17 POWDER, FOR SOLUTION ORAL at 17:26

## 2019-09-02 RX ADMIN — Medication 20 MILLIGRAM(S): at 21:12

## 2019-09-02 RX ADMIN — Medication 1 TABLET(S): at 17:26

## 2019-09-02 RX ADMIN — Medication 100 MILLIGRAM(S): at 21:13

## 2019-09-02 RX ADMIN — MORPHINE SULFATE 15 MILLIGRAM(S): 50 CAPSULE, EXTENDED RELEASE ORAL at 06:36

## 2019-09-02 RX ADMIN — QUETIAPINE FUMARATE 50 MILLIGRAM(S): 200 TABLET, FILM COATED ORAL at 21:12

## 2019-09-02 RX ADMIN — GABAPENTIN 300 MILLIGRAM(S): 400 CAPSULE ORAL at 21:12

## 2019-09-02 RX ADMIN — GABAPENTIN 300 MILLIGRAM(S): 400 CAPSULE ORAL at 05:22

## 2019-09-02 RX ADMIN — Medication 20 MILLIGRAM(S): at 13:04

## 2019-09-02 RX ADMIN — ZINC SULFATE TAB 220 MG (50 MG ZINC EQUIVALENT) 220 MILLIGRAM(S): 220 (50 ZN) TAB at 10:37

## 2019-09-02 RX ADMIN — Medication 500 MILLIGRAM(S): at 10:36

## 2019-09-02 RX ADMIN — MORPHINE SULFATE 15 MILLIGRAM(S): 50 CAPSULE, EXTENDED RELEASE ORAL at 13:33

## 2019-09-02 RX ADMIN — Medication 20 MILLIGRAM(S): at 05:18

## 2019-09-02 RX ADMIN — POLYETHYLENE GLYCOL 3350 17 GRAM(S): 17 POWDER, FOR SOLUTION ORAL at 10:36

## 2019-09-02 RX ADMIN — GABAPENTIN 300 MILLIGRAM(S): 400 CAPSULE ORAL at 13:04
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
## 2019-09-03 PROCEDURE — 99232 SBSQ HOSP IP/OBS MODERATE 35: CPT

## 2019-09-03 RX ADMIN — MORPHINE SULFATE 15 MILLIGRAM(S): 50 CAPSULE, EXTENDED RELEASE ORAL at 14:30

## 2019-09-03 RX ADMIN — GABAPENTIN 300 MILLIGRAM(S): 400 CAPSULE ORAL at 13:41

## 2019-09-03 RX ADMIN — Medication 20 MILLIGRAM(S): at 21:07

## 2019-09-03 RX ADMIN — SENNA PLUS 1 TABLET(S): 8.6 TABLET ORAL at 12:46

## 2019-09-03 RX ADMIN — GABAPENTIN 300 MILLIGRAM(S): 400 CAPSULE ORAL at 05:14

## 2019-09-03 RX ADMIN — QUETIAPINE FUMARATE 50 MILLIGRAM(S): 200 TABLET, FILM COATED ORAL at 21:07

## 2019-09-03 RX ADMIN — MORPHINE SULFATE 15 MILLIGRAM(S): 50 CAPSULE, EXTENDED RELEASE ORAL at 14:57

## 2019-09-03 RX ADMIN — DULOXETINE HYDROCHLORIDE 30 MILLIGRAM(S): 30 CAPSULE, DELAYED RELEASE ORAL at 12:46

## 2019-09-03 RX ADMIN — Medication 50 MILLIGRAM(S): at 21:07

## 2019-09-03 RX ADMIN — MORPHINE SULFATE 15 MILLIGRAM(S): 50 CAPSULE, EXTENDED RELEASE ORAL at 06:13

## 2019-09-03 RX ADMIN — ONDANSETRON 4 MILLIGRAM(S): 8 TABLET, FILM COATED ORAL at 19:19

## 2019-09-03 RX ADMIN — MORPHINE SULFATE 15 MILLIGRAM(S): 50 CAPSULE, EXTENDED RELEASE ORAL at 19:11

## 2019-09-03 RX ADMIN — POLYETHYLENE GLYCOL 3350 17 GRAM(S): 17 POWDER, FOR SOLUTION ORAL at 12:45

## 2019-09-03 RX ADMIN — GABAPENTIN 300 MILLIGRAM(S): 400 CAPSULE ORAL at 21:07

## 2019-09-03 RX ADMIN — Medication 20 MILLIGRAM(S): at 13:41

## 2019-09-03 RX ADMIN — ZINC SULFATE TAB 220 MG (50 MG ZINC EQUIVALENT) 220 MILLIGRAM(S): 220 (50 ZN) TAB at 12:47

## 2019-09-03 RX ADMIN — PANTOPRAZOLE SODIUM 40 MILLIGRAM(S): 20 TABLET, DELAYED RELEASE ORAL at 05:16

## 2019-09-03 RX ADMIN — Medication 20 MILLIGRAM(S): at 05:14

## 2019-09-03 RX ADMIN — Medication 500 MILLIGRAM(S): at 12:46

## 2019-09-03 RX ADMIN — Medication 1 TABLET(S): at 05:14

## 2019-09-03 RX ADMIN — MORPHINE SULFATE 15 MILLIGRAM(S): 50 CAPSULE, EXTENDED RELEASE ORAL at 10:39

## 2019-09-03 RX ADMIN — POLYETHYLENE GLYCOL 3350 17 GRAM(S): 17 POWDER, FOR SOLUTION ORAL at 17:36

## 2019-09-03 RX ADMIN — Medication 1 TABLET(S): at 17:36

## 2019-09-03 RX ADMIN — Medication 100 MILLIGRAM(S): at 05:14

## 2019-09-03 RX ADMIN — MORPHINE SULFATE 15 MILLIGRAM(S): 50 CAPSULE, EXTENDED RELEASE ORAL at 01:35

## 2019-09-03 RX ADMIN — MORPHINE SULFATE 15 MILLIGRAM(S): 50 CAPSULE, EXTENDED RELEASE ORAL at 10:30

## 2019-09-03 RX ADMIN — MORPHINE SULFATE 15 MILLIGRAM(S): 50 CAPSULE, EXTENDED RELEASE ORAL at 19:12

## 2019-09-04 PROCEDURE — 99232 SBSQ HOSP IP/OBS MODERATE 35: CPT

## 2019-09-04 RX ORDER — MORPHINE SULFATE 50 MG/1
15 CAPSULE, EXTENDED RELEASE ORAL EVERY 4 HOURS
Refills: 0 | Status: DISCONTINUED | OUTPATIENT
Start: 2019-09-04 | End: 2019-09-11

## 2019-09-04 RX ADMIN — POLYETHYLENE GLYCOL 3350 17 GRAM(S): 17 POWDER, FOR SOLUTION ORAL at 10:23

## 2019-09-04 RX ADMIN — MORPHINE SULFATE 15 MILLIGRAM(S): 50 CAPSULE, EXTENDED RELEASE ORAL at 14:39

## 2019-09-04 RX ADMIN — MORPHINE SULFATE 15 MILLIGRAM(S): 50 CAPSULE, EXTENDED RELEASE ORAL at 05:27

## 2019-09-04 RX ADMIN — Medication 50 MILLIGRAM(S): at 21:41

## 2019-09-04 RX ADMIN — SENNA PLUS 1 TABLET(S): 8.6 TABLET ORAL at 12:54

## 2019-09-04 RX ADMIN — MORPHINE SULFATE 15 MILLIGRAM(S): 50 CAPSULE, EXTENDED RELEASE ORAL at 19:38

## 2019-09-04 RX ADMIN — PANTOPRAZOLE SODIUM 40 MILLIGRAM(S): 20 TABLET, DELAYED RELEASE ORAL at 05:28

## 2019-09-04 RX ADMIN — ZINC SULFATE TAB 220 MG (50 MG ZINC EQUIVALENT) 220 MILLIGRAM(S): 220 (50 ZN) TAB at 12:54

## 2019-09-04 RX ADMIN — MORPHINE SULFATE 15 MILLIGRAM(S): 50 CAPSULE, EXTENDED RELEASE ORAL at 14:38

## 2019-09-04 RX ADMIN — Medication 500 MILLIGRAM(S): at 12:53

## 2019-09-04 RX ADMIN — Medication 20 MILLIGRAM(S): at 21:41

## 2019-09-04 RX ADMIN — MORPHINE SULFATE 15 MILLIGRAM(S): 50 CAPSULE, EXTENDED RELEASE ORAL at 16:20

## 2019-09-04 RX ADMIN — Medication 1 TABLET(S): at 05:28

## 2019-09-04 RX ADMIN — DULOXETINE HYDROCHLORIDE 30 MILLIGRAM(S): 30 CAPSULE, DELAYED RELEASE ORAL at 12:53

## 2019-09-04 RX ADMIN — QUETIAPINE FUMARATE 50 MILLIGRAM(S): 200 TABLET, FILM COATED ORAL at 21:41

## 2019-09-04 RX ADMIN — GABAPENTIN 300 MILLIGRAM(S): 400 CAPSULE ORAL at 05:28

## 2019-09-04 RX ADMIN — Medication 20 MILLIGRAM(S): at 05:28

## 2019-09-04 RX ADMIN — MORPHINE SULFATE 15 MILLIGRAM(S): 50 CAPSULE, EXTENDED RELEASE ORAL at 10:23

## 2019-09-04 RX ADMIN — MORPHINE SULFATE 15 MILLIGRAM(S): 50 CAPSULE, EXTENDED RELEASE ORAL at 20:10

## 2019-09-04 RX ADMIN — POLYETHYLENE GLYCOL 3350 17 GRAM(S): 17 POWDER, FOR SOLUTION ORAL at 17:38

## 2019-09-04 RX ADMIN — Medication 20 MILLIGRAM(S): at 14:18

## 2019-09-04 RX ADMIN — GABAPENTIN 300 MILLIGRAM(S): 400 CAPSULE ORAL at 14:18

## 2019-09-04 RX ADMIN — GABAPENTIN 300 MILLIGRAM(S): 400 CAPSULE ORAL at 21:41

## 2019-09-04 RX ADMIN — MORPHINE SULFATE 15 MILLIGRAM(S): 50 CAPSULE, EXTENDED RELEASE ORAL at 06:00

## 2019-09-04 RX ADMIN — Medication 1 TABLET(S): at 17:38
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
## 2019-09-05 PROCEDURE — 99232 SBSQ HOSP IP/OBS MODERATE 35: CPT

## 2019-09-05 RX ADMIN — Medication 500 MILLIGRAM(S): at 10:16

## 2019-09-05 RX ADMIN — MORPHINE SULFATE 15 MILLIGRAM(S): 50 CAPSULE, EXTENDED RELEASE ORAL at 00:40

## 2019-09-05 RX ADMIN — GABAPENTIN 300 MILLIGRAM(S): 400 CAPSULE ORAL at 22:59

## 2019-09-05 RX ADMIN — MORPHINE SULFATE 15 MILLIGRAM(S): 50 CAPSULE, EXTENDED RELEASE ORAL at 15:40

## 2019-09-05 RX ADMIN — MORPHINE SULFATE 15 MILLIGRAM(S): 50 CAPSULE, EXTENDED RELEASE ORAL at 14:50

## 2019-09-05 RX ADMIN — Medication 50 MILLIGRAM(S): at 22:59

## 2019-09-05 RX ADMIN — Medication 1 TABLET(S): at 17:33

## 2019-09-05 RX ADMIN — MORPHINE SULFATE 15 MILLIGRAM(S): 50 CAPSULE, EXTENDED RELEASE ORAL at 18:53

## 2019-09-05 RX ADMIN — GABAPENTIN 300 MILLIGRAM(S): 400 CAPSULE ORAL at 06:31

## 2019-09-05 RX ADMIN — MORPHINE SULFATE 15 MILLIGRAM(S): 50 CAPSULE, EXTENDED RELEASE ORAL at 06:31

## 2019-09-05 RX ADMIN — Medication 20 MILLIGRAM(S): at 14:49

## 2019-09-05 RX ADMIN — MORPHINE SULFATE 15 MILLIGRAM(S): 50 CAPSULE, EXTENDED RELEASE ORAL at 10:17

## 2019-09-05 RX ADMIN — Medication 1 TABLET(S): at 06:31

## 2019-09-05 RX ADMIN — QUETIAPINE FUMARATE 50 MILLIGRAM(S): 200 TABLET, FILM COATED ORAL at 22:59

## 2019-09-05 RX ADMIN — MORPHINE SULFATE 15 MILLIGRAM(S): 50 CAPSULE, EXTENDED RELEASE ORAL at 11:00

## 2019-09-05 RX ADMIN — MORPHINE SULFATE 15 MILLIGRAM(S): 50 CAPSULE, EXTENDED RELEASE ORAL at 07:00

## 2019-09-05 RX ADMIN — DULOXETINE HYDROCHLORIDE 30 MILLIGRAM(S): 30 CAPSULE, DELAYED RELEASE ORAL at 10:16

## 2019-09-05 RX ADMIN — Medication 20 MILLIGRAM(S): at 22:58

## 2019-09-05 RX ADMIN — MORPHINE SULFATE 15 MILLIGRAM(S): 50 CAPSULE, EXTENDED RELEASE ORAL at 00:08

## 2019-09-05 RX ADMIN — PANTOPRAZOLE SODIUM 40 MILLIGRAM(S): 20 TABLET, DELAYED RELEASE ORAL at 06:31

## 2019-09-05 RX ADMIN — GABAPENTIN 300 MILLIGRAM(S): 400 CAPSULE ORAL at 14:50

## 2019-09-05 RX ADMIN — Medication 20 MILLIGRAM(S): at 06:31

## 2019-09-05 NOTE — CHART NOTE - NSCHARTNOTEFT_GEN_A_CORE
Registered Dietitian Limited Follow-Up:    Pertinent Medical Information: Pt with chronic surgical wound over right buttock POA. Complicated UTI in pt with chronic SPC.    Pt continues to receive Regular diet with Jordon q8hrs for decubiti to R sacrum/buttocks, and R lateral hip. Pt demonstrates good appetite & tolerance to diet order, with % po intake most meals. No new nutritionally pertinent labs. Meds reviewed. No new wt; no unintentional wt loss suspected at this time. Requested new wt. Pt appears to be meeting estimated nutrient needs at this time with no need for additional supplementation at this time. Will reassess in 7 days.
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
## 2019-09-06 PROCEDURE — 99232 SBSQ HOSP IP/OBS MODERATE 35: CPT

## 2019-09-06 RX ADMIN — GABAPENTIN 300 MILLIGRAM(S): 400 CAPSULE ORAL at 05:51

## 2019-09-06 RX ADMIN — ZINC SULFATE TAB 220 MG (50 MG ZINC EQUIVALENT) 220 MILLIGRAM(S): 220 (50 ZN) TAB at 11:19

## 2019-09-06 RX ADMIN — DULOXETINE HYDROCHLORIDE 30 MILLIGRAM(S): 30 CAPSULE, DELAYED RELEASE ORAL at 11:19

## 2019-09-06 RX ADMIN — PANTOPRAZOLE SODIUM 40 MILLIGRAM(S): 20 TABLET, DELAYED RELEASE ORAL at 06:03

## 2019-09-06 RX ADMIN — MORPHINE SULFATE 15 MILLIGRAM(S): 50 CAPSULE, EXTENDED RELEASE ORAL at 22:00

## 2019-09-06 RX ADMIN — MORPHINE SULFATE 15 MILLIGRAM(S): 50 CAPSULE, EXTENDED RELEASE ORAL at 17:34

## 2019-09-06 RX ADMIN — Medication 20 MILLIGRAM(S): at 21:25

## 2019-09-06 RX ADMIN — Medication 500 MILLIGRAM(S): at 11:19

## 2019-09-06 RX ADMIN — QUETIAPINE FUMARATE 50 MILLIGRAM(S): 200 TABLET, FILM COATED ORAL at 21:25

## 2019-09-06 RX ADMIN — Medication 1 TABLET(S): at 05:51

## 2019-09-06 RX ADMIN — MORPHINE SULFATE 15 MILLIGRAM(S): 50 CAPSULE, EXTENDED RELEASE ORAL at 05:51

## 2019-09-06 RX ADMIN — MORPHINE SULFATE 15 MILLIGRAM(S): 50 CAPSULE, EXTENDED RELEASE ORAL at 06:31

## 2019-09-06 RX ADMIN — Medication 20 MILLIGRAM(S): at 05:51

## 2019-09-06 RX ADMIN — GABAPENTIN 300 MILLIGRAM(S): 400 CAPSULE ORAL at 13:02

## 2019-09-06 RX ADMIN — MORPHINE SULFATE 15 MILLIGRAM(S): 50 CAPSULE, EXTENDED RELEASE ORAL at 18:08

## 2019-09-06 RX ADMIN — MORPHINE SULFATE 15 MILLIGRAM(S): 50 CAPSULE, EXTENDED RELEASE ORAL at 21:35

## 2019-09-06 RX ADMIN — SENNA PLUS 1 TABLET(S): 8.6 TABLET ORAL at 11:19

## 2019-09-06 RX ADMIN — Medication 20 MILLIGRAM(S): at 13:01

## 2019-09-06 RX ADMIN — Medication 50 MILLIGRAM(S): at 21:24

## 2019-09-06 RX ADMIN — Medication 1 TABLET(S): at 17:34

## 2019-09-06 RX ADMIN — MORPHINE SULFATE 15 MILLIGRAM(S): 50 CAPSULE, EXTENDED RELEASE ORAL at 11:19

## 2019-09-06 RX ADMIN — GABAPENTIN 300 MILLIGRAM(S): 400 CAPSULE ORAL at 21:25

## 2019-09-06 RX ADMIN — MORPHINE SULFATE 15 MILLIGRAM(S): 50 CAPSULE, EXTENDED RELEASE ORAL at 12:00
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
## 2019-09-07 PROCEDURE — 99231 SBSQ HOSP IP/OBS SF/LOW 25: CPT

## 2019-09-07 RX ADMIN — PANTOPRAZOLE SODIUM 40 MILLIGRAM(S): 20 TABLET, DELAYED RELEASE ORAL at 05:22

## 2019-09-07 RX ADMIN — MORPHINE SULFATE 15 MILLIGRAM(S): 50 CAPSULE, EXTENDED RELEASE ORAL at 13:38

## 2019-09-07 RX ADMIN — SENNA PLUS 1 TABLET(S): 8.6 TABLET ORAL at 11:04

## 2019-09-07 RX ADMIN — Medication 1 TABLET(S): at 05:21

## 2019-09-07 RX ADMIN — GABAPENTIN 300 MILLIGRAM(S): 400 CAPSULE ORAL at 13:39

## 2019-09-07 RX ADMIN — MORPHINE SULFATE 15 MILLIGRAM(S): 50 CAPSULE, EXTENDED RELEASE ORAL at 18:41

## 2019-09-07 RX ADMIN — MORPHINE SULFATE 15 MILLIGRAM(S): 50 CAPSULE, EXTENDED RELEASE ORAL at 22:39

## 2019-09-07 RX ADMIN — Medication 20 MILLIGRAM(S): at 13:39

## 2019-09-07 RX ADMIN — DULOXETINE HYDROCHLORIDE 30 MILLIGRAM(S): 30 CAPSULE, DELAYED RELEASE ORAL at 11:04

## 2019-09-07 RX ADMIN — GABAPENTIN 300 MILLIGRAM(S): 400 CAPSULE ORAL at 05:22

## 2019-09-07 RX ADMIN — MORPHINE SULFATE 15 MILLIGRAM(S): 50 CAPSULE, EXTENDED RELEASE ORAL at 06:20

## 2019-09-07 RX ADMIN — Medication 20 MILLIGRAM(S): at 05:22

## 2019-09-07 RX ADMIN — MORPHINE SULFATE 15 MILLIGRAM(S): 50 CAPSULE, EXTENDED RELEASE ORAL at 14:38

## 2019-09-07 RX ADMIN — Medication 500 MILLIGRAM(S): at 11:04

## 2019-09-07 RX ADMIN — POLYETHYLENE GLYCOL 3350 17 GRAM(S): 17 POWDER, FOR SOLUTION ORAL at 11:04

## 2019-09-07 RX ADMIN — MORPHINE SULFATE 15 MILLIGRAM(S): 50 CAPSULE, EXTENDED RELEASE ORAL at 23:00

## 2019-09-07 RX ADMIN — POLYETHYLENE GLYCOL 3350 17 GRAM(S): 17 POWDER, FOR SOLUTION ORAL at 17:39

## 2019-09-07 RX ADMIN — Medication 20 MILLIGRAM(S): at 21:16

## 2019-09-07 RX ADMIN — GABAPENTIN 300 MILLIGRAM(S): 400 CAPSULE ORAL at 21:16

## 2019-09-07 RX ADMIN — Medication 1 TABLET(S): at 17:39

## 2019-09-07 RX ADMIN — MORPHINE SULFATE 15 MILLIGRAM(S): 50 CAPSULE, EXTENDED RELEASE ORAL at 17:41

## 2019-09-07 RX ADMIN — ZINC SULFATE TAB 220 MG (50 MG ZINC EQUIVALENT) 220 MILLIGRAM(S): 220 (50 ZN) TAB at 11:04
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
## 2019-09-07 NOTE — PROGRESS NOTE ADULT - SUBJECTIVE AND OBJECTIVE BOX
MARCELINO CAROLINA  34y, Male  Allergy: ibuprofen (Hives; Rash)    Hospital Day: 25d    Patient seen and examined earlier today.  No new complaints/issues    PMH/PSH:  PAST MEDICAL & SURGICAL HISTORY:  Paraplegia  Chronic UTI  History of skin graft    VITALS:  T(F): 97.8 (09-07-19 @ 05:00), Max: 98 (09-06-19 @ 13:39)  HR: 80 (09-07-19 @ 05:00)  BP: 90/52 (09-07-19 @ 05:00) (90/52 - 123/64)  RR: 16 (09-07-19 @ 05:00)  SpO2: --    PHYSICAL EXAM:  GENERAL: NAD  HEENT: MMM  CVS:  RRR  CHEST/LUNG: Good air entry, no wheeze  ABD:  soft, NT/ND +bs  EXTREMITIES:  no edema  NEURO: b/l paraplegia    TESTS & MEASUREMENTS:      RADIOLOGY & ADDITIONAL TESTS:    MEDICATIONS:  MEDICATIONS  (STANDING):  ascorbic acid 500 milliGRAM(s) Oral daily  baclofen 20 milliGRAM(s) Oral three times a day  chlorhexidine 4% Liquid 1 Application(s) Topical <User Schedule>  DULoxetine 30 milliGRAM(s) Oral daily  enoxaparin Injectable 40 milliGRAM(s) SubCutaneous daily  gabapentin 300 milliGRAM(s) Oral three times a day  pantoprazole    Tablet 40 milliGRAM(s) Oral before breakfast  polyethylene glycol 3350 17 Gram(s) Oral <User Schedule>  QUEtiapine 50 milliGRAM(s) Oral at bedtime  senna 1 Tablet(s) Oral daily  traZODone 50 milliGRAM(s) Oral at bedtime  trimethoprim  160 mG/sulfamethoxazole 800 mG 1 Tablet(s) Oral two times a day  zinc sulfate 220 milliGRAM(s) Oral daily    MEDICATIONS  (PRN):  aluminum hydroxide/magnesium hydroxide/simethicone Suspension 30 milliLiter(s) Oral every 4 hours PRN Dyspepsia  morphine  IR 15 milliGRAM(s) Oral every 4 hours PRN Severe Pain (7 - 10)  ondansetron    Tablet 4 milliGRAM(s) Oral every 6 hours PRN Nausea and/or Vomiting      HOME MEDICATIONS:

## 2019-09-08 LAB
APPEARANCE UR: CLEAR — SIGNIFICANT CHANGE UP
BACTERIA # UR AUTO: SIGNIFICANT CHANGE UP /HPF
BILIRUB UR-MCNC: NEGATIVE — SIGNIFICANT CHANGE UP
COLOR SPEC: YELLOW — SIGNIFICANT CHANGE UP
DIFF PNL FLD: ABNORMAL
EPI CELLS # UR: ABNORMAL /HPF
GLUCOSE UR QL: NEGATIVE MG/DL — SIGNIFICANT CHANGE UP
KETONES UR-MCNC: NEGATIVE — SIGNIFICANT CHANGE UP
LEUKOCYTE ESTERASE UR-ACNC: ABNORMAL
NITRITE UR-MCNC: NEGATIVE — SIGNIFICANT CHANGE UP
PH UR: 7 — SIGNIFICANT CHANGE UP (ref 5–8)
PROT UR-MCNC: NEGATIVE MG/DL — SIGNIFICANT CHANGE UP
RBC CASTS # UR COMP ASSIST: SIGNIFICANT CHANGE UP /HPF
SP GR SPEC: 1.01 — SIGNIFICANT CHANGE UP (ref 1.01–1.03)
UROBILINOGEN FLD QL: 0.2 MG/DL — SIGNIFICANT CHANGE UP (ref 0.2–0.2)
WBC UR QL: ABNORMAL /HPF

## 2019-09-08 PROCEDURE — 99232 SBSQ HOSP IP/OBS MODERATE 35: CPT

## 2019-09-08 RX ORDER — PHENAZOPYRIDINE HCL 100 MG
100 TABLET ORAL EVERY 8 HOURS
Refills: 0 | Status: COMPLETED | OUTPATIENT
Start: 2019-09-08 | End: 2019-09-11

## 2019-09-08 RX ADMIN — Medication 20 MILLIGRAM(S): at 05:36

## 2019-09-08 RX ADMIN — MORPHINE SULFATE 15 MILLIGRAM(S): 50 CAPSULE, EXTENDED RELEASE ORAL at 08:28

## 2019-09-08 RX ADMIN — POLYETHYLENE GLYCOL 3350 17 GRAM(S): 17 POWDER, FOR SOLUTION ORAL at 17:26

## 2019-09-08 RX ADMIN — MORPHINE SULFATE 15 MILLIGRAM(S): 50 CAPSULE, EXTENDED RELEASE ORAL at 13:36

## 2019-09-08 RX ADMIN — POLYETHYLENE GLYCOL 3350 17 GRAM(S): 17 POWDER, FOR SOLUTION ORAL at 10:18

## 2019-09-08 RX ADMIN — Medication 20 MILLIGRAM(S): at 22:16

## 2019-09-08 RX ADMIN — Medication 100 MILLIGRAM(S): at 13:38

## 2019-09-08 RX ADMIN — DULOXETINE HYDROCHLORIDE 30 MILLIGRAM(S): 30 CAPSULE, DELAYED RELEASE ORAL at 11:57

## 2019-09-08 RX ADMIN — GABAPENTIN 300 MILLIGRAM(S): 400 CAPSULE ORAL at 05:36

## 2019-09-08 RX ADMIN — Medication 20 MILLIGRAM(S): at 13:36

## 2019-09-08 RX ADMIN — Medication 1 TABLET(S): at 05:36

## 2019-09-08 RX ADMIN — Medication 100 MILLIGRAM(S): at 22:16

## 2019-09-08 RX ADMIN — GABAPENTIN 300 MILLIGRAM(S): 400 CAPSULE ORAL at 13:33

## 2019-09-08 RX ADMIN — MORPHINE SULFATE 15 MILLIGRAM(S): 50 CAPSULE, EXTENDED RELEASE ORAL at 18:07

## 2019-09-08 RX ADMIN — PANTOPRAZOLE SODIUM 40 MILLIGRAM(S): 20 TABLET, DELAYED RELEASE ORAL at 05:37

## 2019-09-08 RX ADMIN — ZINC SULFATE TAB 220 MG (50 MG ZINC EQUIVALENT) 220 MILLIGRAM(S): 220 (50 ZN) TAB at 11:57

## 2019-09-08 RX ADMIN — Medication 500 MILLIGRAM(S): at 11:57

## 2019-09-08 RX ADMIN — SENNA PLUS 1 TABLET(S): 8.6 TABLET ORAL at 11:57

## 2019-09-08 RX ADMIN — GABAPENTIN 300 MILLIGRAM(S): 400 CAPSULE ORAL at 22:15

## 2019-09-08 NOTE — PROGRESS NOTE ADULT - SUBJECTIVE AND OBJECTIVE BOX
MARCELINO CAROLINA  34y, Male  Allergy: ibuprofen (Hives; Rash)    Hospital Day: 26d    Patient seen and examined earlier today.  c/o burning bladder pain    PMH/PSH:  PAST MEDICAL & SURGICAL HISTORY:  Paraplegia  Chronic UTI  History of skin graft    VITALS:  T(F): 96.4 (09-08-19 @ 04:59), Max: 97.7 (09-07-19 @ 21:50)  HR: 73 (09-08-19 @ 04:59)  BP: 85/41 (09-08-19 @ 04:59) (85/41 - 126/89)  RR: 16 (09-08-19 @ 04:59)  SpO2: --    PHYSICAL EXAM:  GENERAL: NAD  HEENT: MMM  CVS:  RRR  CHEST/LUNG: Good air entry, no wheeze  ABD:  soft, NT/ND +bs  EXTREMITIES:  no edema  NEURO: AOx3    TESTS & MEASUREMENTS:    RADIOLOGY & ADDITIONAL TESTS:    MEDICATIONS:  MEDICATIONS  (STANDING):  ascorbic acid 500 milliGRAM(s) Oral daily  baclofen 20 milliGRAM(s) Oral three times a day  chlorhexidine 4% Liquid 1 Application(s) Topical <User Schedule>  DULoxetine 30 milliGRAM(s) Oral daily  enoxaparin Injectable 40 milliGRAM(s) SubCutaneous daily  gabapentin 300 milliGRAM(s) Oral three times a day  pantoprazole    Tablet 40 milliGRAM(s) Oral before breakfast  phenazopyridine 100 milliGRAM(s) Oral every 8 hours  polyethylene glycol 3350 17 Gram(s) Oral <User Schedule>  QUEtiapine 50 milliGRAM(s) Oral at bedtime  senna 1 Tablet(s) Oral daily  traZODone 50 milliGRAM(s) Oral at bedtime  zinc sulfate 220 milliGRAM(s) Oral daily    MEDICATIONS  (PRN):  aluminum hydroxide/magnesium hydroxide/simethicone Suspension 30 milliLiter(s) Oral every 4 hours PRN Dyspepsia  morphine  IR 15 milliGRAM(s) Oral every 4 hours PRN Severe Pain (7 - 10)  ondansetron    Tablet 4 milliGRAM(s) Oral every 6 hours PRN Nausea and/or Vomiting      HOME MEDICATIONS:
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
## 2019-09-09 PROCEDURE — 99232 SBSQ HOSP IP/OBS MODERATE 35: CPT

## 2019-09-09 RX ADMIN — SENNA PLUS 1 TABLET(S): 8.6 TABLET ORAL at 10:20

## 2019-09-09 RX ADMIN — GABAPENTIN 300 MILLIGRAM(S): 400 CAPSULE ORAL at 05:58

## 2019-09-09 RX ADMIN — Medication 20 MILLIGRAM(S): at 14:37

## 2019-09-09 RX ADMIN — POLYETHYLENE GLYCOL 3350 17 GRAM(S): 17 POWDER, FOR SOLUTION ORAL at 17:48

## 2019-09-09 RX ADMIN — MORPHINE SULFATE 15 MILLIGRAM(S): 50 CAPSULE, EXTENDED RELEASE ORAL at 11:00

## 2019-09-09 RX ADMIN — Medication 500 MILLIGRAM(S): at 10:21

## 2019-09-09 RX ADMIN — MORPHINE SULFATE 15 MILLIGRAM(S): 50 CAPSULE, EXTENDED RELEASE ORAL at 15:55

## 2019-09-09 RX ADMIN — MORPHINE SULFATE 15 MILLIGRAM(S): 50 CAPSULE, EXTENDED RELEASE ORAL at 16:30

## 2019-09-09 RX ADMIN — Medication 100 MILLIGRAM(S): at 21:26

## 2019-09-09 RX ADMIN — MORPHINE SULFATE 15 MILLIGRAM(S): 50 CAPSULE, EXTENDED RELEASE ORAL at 10:19

## 2019-09-09 RX ADMIN — Medication 20 MILLIGRAM(S): at 05:58

## 2019-09-09 RX ADMIN — GABAPENTIN 300 MILLIGRAM(S): 400 CAPSULE ORAL at 21:25

## 2019-09-09 RX ADMIN — PANTOPRAZOLE SODIUM 40 MILLIGRAM(S): 20 TABLET, DELAYED RELEASE ORAL at 05:59

## 2019-09-09 RX ADMIN — GABAPENTIN 300 MILLIGRAM(S): 400 CAPSULE ORAL at 14:37

## 2019-09-09 RX ADMIN — MORPHINE SULFATE 15 MILLIGRAM(S): 50 CAPSULE, EXTENDED RELEASE ORAL at 21:32

## 2019-09-09 RX ADMIN — Medication 100 MILLIGRAM(S): at 14:37

## 2019-09-09 RX ADMIN — Medication 20 MILLIGRAM(S): at 21:26

## 2019-09-09 RX ADMIN — DULOXETINE HYDROCHLORIDE 30 MILLIGRAM(S): 30 CAPSULE, DELAYED RELEASE ORAL at 10:21

## 2019-09-09 RX ADMIN — ZINC SULFATE TAB 220 MG (50 MG ZINC EQUIVALENT) 220 MILLIGRAM(S): 220 (50 ZN) TAB at 10:21

## 2019-09-09 RX ADMIN — QUETIAPINE FUMARATE 50 MILLIGRAM(S): 200 TABLET, FILM COATED ORAL at 21:25

## 2019-09-09 RX ADMIN — Medication 50 MILLIGRAM(S): at 21:25

## 2019-09-09 RX ADMIN — POLYETHYLENE GLYCOL 3350 17 GRAM(S): 17 POWDER, FOR SOLUTION ORAL at 10:20

## 2019-09-09 RX ADMIN — Medication 100 MILLIGRAM(S): at 05:58

## 2019-09-09 NOTE — CHART NOTE - NSCHARTNOTEFT_GEN_A_CORE
Saw Pt at bed side.    Pt has no new complaints. Bladder pain has improved as per patient.     Reviewed labs, imaging and new consults.     Pt is awaiting long term placement.     Case discussed with the attending Saw Pt at bed side.    Pt has no new complaints. Bladder pain has improved as per patient.     Reviewed labs, imaging and new consults.     Pt is awaiting long term placement.     Case to be discussed with the attending. No new issues.
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
## 2019-09-09 NOTE — PROGRESS NOTE ADULT - SUBJECTIVE AND OBJECTIVE BOX
CAITYMARCELINO  34y, Male  Allergy: ibuprofen (Hives; Rash)    Hospital Day: 27d    Patient seen and examined earlier today.  bladder pain a little better.    PMH/PSH:  PAST MEDICAL & SURGICAL HISTORY:  Paraplegia  Chronic UTI  History of skin graft    VITALS:  T(F): 98.8 (19 @ 05:20), Max: 98.8 (19 @ 05:20)  HR: 78 (19 @ 09:39)  BP: 106/67 (19 @ 09:39) (81/47 - 112/55)  RR: 16 (19 @ 05:20)  SpO2: --    PHYSICAL EXAM:  GENERAL: NAD  HEENT: MMM  CVS:  RRR  CHEST/LUNG: Good air entry, no wheeze  ABD:  soft, NT/ND +bs  EXTREMITIES:  no edema  NEURO: AOx3, paraplegia  +SPC    TESTS & MEASUREMENTS:    Urinalysis Basic - ( 08 Sep 2019 16:10 )    Color: Yellow / Appearance: Clear / S.015 / pH: x  Gluc: x / Ketone: Negative  / Bili: Negative / Urobili: 0.2 mg/dL   Blood: x / Protein: Negative mg/dL / Nitrite: Negative   Leuk Esterase: Small / RBC: 1-2 /HPF / WBC 26-50 /HPF   Sq Epi: x / Non Sq Epi: Few /HPF / Bacteria: Occasional /HPF    RADIOLOGY & ADDITIONAL TESTS:      MEDICATIONS:  MEDICATIONS  (STANDING):  ascorbic acid 500 milliGRAM(s) Oral daily  baclofen 20 milliGRAM(s) Oral three times a day  chlorhexidine 4% Liquid 1 Application(s) Topical <User Schedule>  DULoxetine 30 milliGRAM(s) Oral daily  enoxaparin Injectable 40 milliGRAM(s) SubCutaneous daily  gabapentin 300 milliGRAM(s) Oral three times a day  pantoprazole    Tablet 40 milliGRAM(s) Oral before breakfast  phenazopyridine 100 milliGRAM(s) Oral every 8 hours  polyethylene glycol 3350 17 Gram(s) Oral <User Schedule>  QUEtiapine 50 milliGRAM(s) Oral at bedtime  senna 1 Tablet(s) Oral daily  traZODone 50 milliGRAM(s) Oral at bedtime  zinc sulfate 220 milliGRAM(s) Oral daily    MEDICATIONS  (PRN):  aluminum hydroxide/magnesium hydroxide/simethicone Suspension 30 milliLiter(s) Oral every 4 hours PRN Dyspepsia  morphine  IR 15 milliGRAM(s) Oral every 4 hours PRN Severe Pain (7 - 10)  ondansetron    Tablet 4 milliGRAM(s) Oral every 6 hours PRN Nausea and/or Vomiting      HOME MEDICATIONS:

## 2019-09-10 LAB
CULTURE RESULTS: SIGNIFICANT CHANGE UP
SPECIMEN SOURCE: SIGNIFICANT CHANGE UP

## 2019-09-10 PROCEDURE — 99231 SBSQ HOSP IP/OBS SF/LOW 25: CPT

## 2019-09-10 RX ADMIN — Medication 100 MILLIGRAM(S): at 22:00

## 2019-09-10 RX ADMIN — Medication 500 MILLIGRAM(S): at 12:39

## 2019-09-10 RX ADMIN — CHLORHEXIDINE GLUCONATE 1 APPLICATION(S): 213 SOLUTION TOPICAL at 05:02

## 2019-09-10 RX ADMIN — Medication 20 MILLIGRAM(S): at 22:00

## 2019-09-10 RX ADMIN — GABAPENTIN 300 MILLIGRAM(S): 400 CAPSULE ORAL at 22:00

## 2019-09-10 RX ADMIN — Medication 100 MILLIGRAM(S): at 05:03

## 2019-09-10 RX ADMIN — Medication 50 MILLIGRAM(S): at 22:00

## 2019-09-10 RX ADMIN — MORPHINE SULFATE 15 MILLIGRAM(S): 50 CAPSULE, EXTENDED RELEASE ORAL at 19:58

## 2019-09-10 RX ADMIN — DULOXETINE HYDROCHLORIDE 30 MILLIGRAM(S): 30 CAPSULE, DELAYED RELEASE ORAL at 12:39

## 2019-09-10 RX ADMIN — MORPHINE SULFATE 15 MILLIGRAM(S): 50 CAPSULE, EXTENDED RELEASE ORAL at 09:23

## 2019-09-10 RX ADMIN — MORPHINE SULFATE 15 MILLIGRAM(S): 50 CAPSULE, EXTENDED RELEASE ORAL at 14:45

## 2019-09-10 RX ADMIN — MORPHINE SULFATE 15 MILLIGRAM(S): 50 CAPSULE, EXTENDED RELEASE ORAL at 15:15

## 2019-09-10 RX ADMIN — Medication 20 MILLIGRAM(S): at 14:47

## 2019-09-10 RX ADMIN — Medication 20 MILLIGRAM(S): at 05:02

## 2019-09-10 RX ADMIN — MORPHINE SULFATE 15 MILLIGRAM(S): 50 CAPSULE, EXTENDED RELEASE ORAL at 00:07

## 2019-09-10 RX ADMIN — ZINC SULFATE TAB 220 MG (50 MG ZINC EQUIVALENT) 220 MILLIGRAM(S): 220 (50 ZN) TAB at 12:39

## 2019-09-10 RX ADMIN — QUETIAPINE FUMARATE 50 MILLIGRAM(S): 200 TABLET, FILM COATED ORAL at 22:00

## 2019-09-10 RX ADMIN — GABAPENTIN 300 MILLIGRAM(S): 400 CAPSULE ORAL at 14:46

## 2019-09-10 RX ADMIN — GABAPENTIN 300 MILLIGRAM(S): 400 CAPSULE ORAL at 05:02

## 2019-09-10 RX ADMIN — MORPHINE SULFATE 15 MILLIGRAM(S): 50 CAPSULE, EXTENDED RELEASE ORAL at 10:00

## 2019-09-10 RX ADMIN — PANTOPRAZOLE SODIUM 40 MILLIGRAM(S): 20 TABLET, DELAYED RELEASE ORAL at 06:11

## 2019-09-10 RX ADMIN — Medication 100 MILLIGRAM(S): at 14:46

## 2019-09-10 RX ADMIN — MORPHINE SULFATE 15 MILLIGRAM(S): 50 CAPSULE, EXTENDED RELEASE ORAL at 20:45

## 2019-09-10 NOTE — CHART NOTE - NSCHARTNOTEFT_GEN_A_CORE
Saw Pt at bed side.    Pt denies any new complaints.     Reviewed labs, imaging and new consults.     Pt understands that we are waiting for long term placement.
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
## 2019-09-10 NOTE — PROGRESS NOTE ADULT - SUBJECTIVE AND OBJECTIVE BOX
CAITYMARCELINO CHAUDHRY  34y, Male  Allergy: ibuprofen (Hives; Rash)    Hospital Day: 28d    Patient seen and examined earlier today.  Pt going outside to smoke, seen in hallway.  doing about the same    PMH/PSH:  PAST MEDICAL & SURGICAL HISTORY:  Paraplegia  Chronic UTI  History of skin graft      VITALS:  T(F): 96.7 (09-10-19 @ 14:00), Max: 99 (19 @ 21:25)  HR: 72 (09-10-19 @ 14:00)  BP: 133/61 (09-10-19 @ 14:00) (96/54 - 133/61)  RR: 18 (09-10-19 @ 14:00)  SpO2: 97% (09-10-19 @ 06:16)    PHYSICAL EXAM:  GENERAL: NAD  HEENT: MMM  CVS:  RRR  CHEST/LUNG: Good air entry, no wheeze  ABD:  soft, NT/ND +bs  EXTREMITIES:  no edema  NEURO: AOx3    TESTS & MEASUREMENTS:        Culture - Urine (collected 19 @ 16:06)  Source: Suprapubic Suprapubic  Preliminary Report (19 @ 20:35):    No growth to date.    Urinalysis Basic - ( 08 Sep 2019 16:10 )    Color: Yellow / Appearance: Clear / S.015 / pH: x  Gluc: x / Ketone: Negative  / Bili: Negative / Urobili: 0.2 mg/dL   Blood: x / Protein: Negative mg/dL / Nitrite: Negative   Leuk Esterase: Small / RBC: 1-2 /HPF / WBC 26-50 /HPF   Sq Epi: x / Non Sq Epi: Few /HPF / Bacteria: Occasional /HPF      RADIOLOGY & ADDITIONAL TESTS:    MEDICATIONS:  MEDICATIONS  (STANDING):  ascorbic acid 500 milliGRAM(s) Oral daily  baclofen 20 milliGRAM(s) Oral three times a day  chlorhexidine 4% Liquid 1 Application(s) Topical <User Schedule>  DULoxetine 30 milliGRAM(s) Oral daily  enoxaparin Injectable 40 milliGRAM(s) SubCutaneous daily  gabapentin 300 milliGRAM(s) Oral three times a day  pantoprazole    Tablet 40 milliGRAM(s) Oral before breakfast  phenazopyridine 100 milliGRAM(s) Oral every 8 hours  polyethylene glycol 3350 17 Gram(s) Oral <User Schedule>  QUEtiapine 50 milliGRAM(s) Oral at bedtime  senna 1 Tablet(s) Oral daily  traZODone 50 milliGRAM(s) Oral at bedtime  zinc sulfate 220 milliGRAM(s) Oral daily    MEDICATIONS  (PRN):  aluminum hydroxide/magnesium hydroxide/simethicone Suspension 30 milliLiter(s) Oral every 4 hours PRN Dyspepsia  morphine  IR 15 milliGRAM(s) Oral every 4 hours PRN Severe Pain (7 - 10)  ondansetron    Tablet 4 milliGRAM(s) Oral every 6 hours PRN Nausea and/or Vomiting      HOME MEDICATIONS:

## 2019-09-11 ENCOUNTER — TRANSCRIPTION ENCOUNTER (OUTPATIENT)
Age: 34
End: 2019-09-11

## 2019-09-11 VITALS — SYSTOLIC BLOOD PRESSURE: 86 MMHG | TEMPERATURE: 99 F | DIASTOLIC BLOOD PRESSURE: 56 MMHG | HEART RATE: 81 BPM

## 2019-09-11 PROCEDURE — 99239 HOSP IP/OBS DSCHRG MGMT >30: CPT

## 2019-09-11 RX ADMIN — Medication 100 MILLIGRAM(S): at 06:40

## 2019-09-11 RX ADMIN — GABAPENTIN 300 MILLIGRAM(S): 400 CAPSULE ORAL at 14:03

## 2019-09-11 RX ADMIN — MORPHINE SULFATE 15 MILLIGRAM(S): 50 CAPSULE, EXTENDED RELEASE ORAL at 15:39

## 2019-09-11 RX ADMIN — SENNA PLUS 1 TABLET(S): 8.6 TABLET ORAL at 11:21

## 2019-09-11 RX ADMIN — DULOXETINE HYDROCHLORIDE 30 MILLIGRAM(S): 30 CAPSULE, DELAYED RELEASE ORAL at 11:16

## 2019-09-11 RX ADMIN — MORPHINE SULFATE 15 MILLIGRAM(S): 50 CAPSULE, EXTENDED RELEASE ORAL at 11:21

## 2019-09-11 RX ADMIN — POLYETHYLENE GLYCOL 3350 17 GRAM(S): 17 POWDER, FOR SOLUTION ORAL at 11:16

## 2019-09-11 RX ADMIN — Medication 20 MILLIGRAM(S): at 14:03

## 2019-09-11 RX ADMIN — Medication 20 MILLIGRAM(S): at 06:40

## 2019-09-11 RX ADMIN — Medication 500 MILLIGRAM(S): at 11:17

## 2019-09-11 RX ADMIN — GABAPENTIN 300 MILLIGRAM(S): 400 CAPSULE ORAL at 06:40

## 2019-09-11 RX ADMIN — PANTOPRAZOLE SODIUM 40 MILLIGRAM(S): 20 TABLET, DELAYED RELEASE ORAL at 06:40

## 2019-09-11 RX ADMIN — MORPHINE SULFATE 15 MILLIGRAM(S): 50 CAPSULE, EXTENDED RELEASE ORAL at 06:58

## 2019-09-11 RX ADMIN — ZINC SULFATE TAB 220 MG (50 MG ZINC EQUIVALENT) 220 MILLIGRAM(S): 220 (50 ZN) TAB at 11:17

## 2019-09-11 NOTE — PROGRESS NOTE ADULT - REASON FOR ADMISSION
Social admission for wound care
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
## 2019-09-11 NOTE — PROGRESS NOTE ADULT - SUBJECTIVE AND OBJECTIVE BOX
SUBJECTIVE:    Patient is a 34y old Male who presents with a chief complaint of Social admission for wound care (10 Sep 2019 15:11)    Currently admitted to medicine with the primary diagnosis of Surgical wound present     Today is hospital day 29d.     PAST MEDICAL & SURGICAL HISTORY  Paraplegia  Chronic UTI  History of skin graft    ALLERGIES:  ibuprofen (Hives; Rash)    MEDICATIONS:  STANDING MEDICATIONS  ascorbic acid 500 milliGRAM(s) Oral daily  baclofen 20 milliGRAM(s) Oral three times a day  chlorhexidine 4% Liquid 1 Application(s) Topical <User Schedule>  DULoxetine 30 milliGRAM(s) Oral daily  enoxaparin Injectable 40 milliGRAM(s) SubCutaneous daily  gabapentin 300 milliGRAM(s) Oral three times a day  pantoprazole    Tablet 40 milliGRAM(s) Oral before breakfast  polyethylene glycol 3350 17 Gram(s) Oral <User Schedule>  QUEtiapine 50 milliGRAM(s) Oral at bedtime  senna 1 Tablet(s) Oral daily  traZODone 50 milliGRAM(s) Oral at bedtime  zinc sulfate 220 milliGRAM(s) Oral daily    PRN MEDICATIONS  aluminum hydroxide/magnesium hydroxide/simethicone Suspension 30 milliLiter(s) Oral every 4 hours PRN  morphine  IR 15 milliGRAM(s) Oral every 4 hours PRN  ondansetron    Tablet 4 milliGRAM(s) Oral every 6 hours PRN    VITALS:   T(F): 97.4  HR: 67  BP: 102/61  RR: 16  SpO2: --    LABS:                    Culture - Urine (collected 08 Sep 2019 16:06)  Source: Suprapubic Suprapubic  Final Report (10 Sep 2019 17:10):    No growth at 48 hours          RADIOLOGY:    PHYSICAL EXAM:  GEN: No acute distress  LUNGS: Clear to auscultation bilaterally   HEART: S1/S2 present. RRR.   ABD/ GI: Soft, non-tender, non-distended. Bowel sounds present  EXT:paraplegic at baseline  NEURO: AAOX3

## 2019-09-11 NOTE — DISCHARGE NOTE NURSING/CASE MANAGEMENT/SOCIAL WORK - PATIENT PORTAL LINK FT
You can access the FollowMyHealth Patient Portal offered by [facility] by registering at the following website: [email or web address]. By joining [employer]’s FollowMyHealth portal, you will also be able to view your health information using other applications (apps) compatible with our system.

## 2019-09-11 NOTE — PROGRESS NOTE ADULT - PROVIDER SPECIALTY LIST ADULT
Hospitalist

## 2019-09-13 PROBLEM — Z00.00 ENCOUNTER FOR PREVENTIVE HEALTH EXAMINATION: Status: ACTIVE | Noted: 2019-09-13
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
## 2019-09-16 DIAGNOSIS — Z98.890 OTHER SPECIFIED POSTPROCEDURAL STATES: ICD-10-CM

## 2019-09-16 DIAGNOSIS — L89.312 PRESSURE ULCER OF RIGHT BUTTOCK, STAGE 2: ICD-10-CM

## 2019-09-16 DIAGNOSIS — G89.29 OTHER CHRONIC PAIN: ICD-10-CM

## 2019-09-16 DIAGNOSIS — Z96.0 PRESENCE OF UROGENITAL IMPLANTS: ICD-10-CM

## 2019-09-16 DIAGNOSIS — Z87.440 PERSONAL HISTORY OF URINARY (TRACT) INFECTIONS: ICD-10-CM

## 2019-09-16 DIAGNOSIS — Z74.2 NEED FOR ASSISTANCE AT HOME AND NO OTHER HOUSEHOLD MEMBER ABLE TO RENDER CARE: ICD-10-CM

## 2019-09-16 DIAGNOSIS — T83.510A INFECTION AND INFLAMMATORY REACTION DUE TO CYSTOSTOMY CATHETER, INITIAL ENCOUNTER: ICD-10-CM

## 2019-09-16 DIAGNOSIS — N39.0 URINARY TRACT INFECTION, SITE NOT SPECIFIED: ICD-10-CM
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
## 2019-09-16 DIAGNOSIS — G82.20 PARAPLEGIA, UNSPECIFIED: ICD-10-CM

## 2019-09-16 DIAGNOSIS — Z88.8 ALLERGY STATUS TO OTHER DRUGS, MEDICAMENTS AND BIOLOGICAL SUBSTANCES: ICD-10-CM

## 2019-09-16 DIAGNOSIS — Z79.899 OTHER LONG TERM (CURRENT) DRUG THERAPY: ICD-10-CM

## 2019-09-16 DIAGNOSIS — S24.103S: ICD-10-CM

## 2019-09-20 ENCOUNTER — APPOINTMENT (OUTPATIENT)
Dept: INTERNAL MEDICINE | Facility: CLINIC | Age: 34
End: 2019-09-20

## 2019-10-01 PROBLEM — G82.20 PARAPLEGIA, UNSPECIFIED: Chronic | Status: ACTIVE | Noted: 2019-08-13

## 2019-10-01 PROBLEM — N39.0 URINARY TRACT INFECTION, SITE NOT SPECIFIED: Chronic | Status: ACTIVE | Noted: 2019-08-13

## 2019-10-25 ENCOUNTER — APPOINTMENT (OUTPATIENT)
Dept: UROLOGY | Facility: CLINIC | Age: 34
End: 2019-10-25

## 2023-12-18 NOTE — PHYSICAL THERAPY INITIAL EVALUATION ADULT - PERSONAL SAFETY AND JUDGMENT, REHAB EVAL
Pt seen and evaluated by AMY Canseco. Pt has no complaints at this time and verbalized understanding of education. Pt wheeled out of ER by family. No acute distress noted. See provider notes.        intact